# Patient Record
Sex: MALE | Race: BLACK OR AFRICAN AMERICAN | NOT HISPANIC OR LATINO | URBAN - METROPOLITAN AREA
[De-identification: names, ages, dates, MRNs, and addresses within clinical notes are randomized per-mention and may not be internally consistent; named-entity substitution may affect disease eponyms.]

---

## 2017-01-17 ENCOUNTER — EMERGENCY (EMERGENCY)
Facility: HOSPITAL | Age: 32
LOS: 1 days | Discharge: HOME | End: 2017-01-17
Attending: EMERGENCY MEDICINE | Admitting: EMERGENCY MEDICINE
Payer: COMMERCIAL

## 2017-01-17 VITALS
SYSTOLIC BLOOD PRESSURE: 170 MMHG | DIASTOLIC BLOOD PRESSURE: 98 MMHG | RESPIRATION RATE: 18 BRPM | TEMPERATURE: 98 F | HEART RATE: 87 BPM | OXYGEN SATURATION: 99 %
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
## 2017-01-17 DIAGNOSIS — Z91.013 ALLERGY TO SEAFOOD: ICD-10-CM

## 2017-01-17 DIAGNOSIS — S61.212A LACERATION WITHOUT FOREIGN BODY OF RIGHT MIDDLE FINGER WITHOUT DAMAGE TO NAIL, INITIAL ENCOUNTER: ICD-10-CM
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
## 2017-01-17 DIAGNOSIS — Z23 ENCOUNTER FOR IMMUNIZATION: ICD-10-CM
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
## 2017-01-17 PROCEDURE — 90471 IMMUNIZATION ADMIN: CPT

## 2017-01-17 PROCEDURE — 90715 TDAP VACCINE 7 YRS/> IM: CPT

## 2017-01-17 PROCEDURE — 99283 EMERGENCY DEPT VISIT LOW MDM: CPT

## 2017-01-17 PROCEDURE — 99283 EMERGENCY DEPT VISIT LOW MDM: CPT | Mod: 25

## 2017-01-17 RX ORDER — TETANUS TOXOID, REDUCED DIPHTHERIA TOXOID AND ACELLULAR PERTUSSIS VACCINE, ADSORBED 5; 2.5; 8; 8; 2.5 [IU]/.5ML; [IU]/.5ML; UG/.5ML; UG/.5ML; UG/.5ML
0.5 SUSPENSION INTRAMUSCULAR ONCE
Qty: 0 | Refills: 0 | Status: COMPLETED | OUTPATIENT
Start: 2017-01-17 | End: 2017-01-17

## 2017-01-17 RX ORDER — IBUPROFEN 200 MG
600 TABLET ORAL ONCE
Qty: 0 | Refills: 0 | Status: COMPLETED | OUTPATIENT
Start: 2017-01-17 | End: 2017-01-17

## 2017-01-17 RX ADMIN — TETANUS TOXOID, REDUCED DIPHTHERIA TOXOID AND ACELLULAR PERTUSSIS VACCINE, ADSORBED 0.5 MILLILITER(S): 5; 2.5; 8; 8; 2.5 SUSPENSION INTRAMUSCULAR at 11:33

## 2017-01-17 RX ADMIN — Medication 600 MILLIGRAM(S): at 11:34

## 2017-01-17 NOTE — ED PROVIDER NOTE - NS ED ATTENDING STATEMENT MOD
Attending Only I have personally seen and examined this patient. I have fully participated in the care of this patient. I have reviewed all pertinent clinical information, including history physical exam, plan and the Resident's note and agree except as noted

## 2017-01-17 NOTE — ED PROCEDURE NOTE - CPROC ED PHYSICIAN PRESENCE1
I was present during the key portion of the procedure.
I was present during the key portion of the procedure.

## 2017-01-17 NOTE — ED PROVIDER NOTE - PMH
No pertinent past medical history    No pertinent past medical history    Phalanx (hand) fracture  right

## 2017-01-17 NOTE — ED PROVIDER NOTE - MEDICAL DECISION MAKING DETAILS
32yo M w fingertip lac less than 12 hrs ago. Will update tetanus, repair laceration Dr Gifford - 30yo M w fingertip lac less than 12 hrs ago. Will update tetanus, repair laceration

## 2017-01-17 NOTE — ED PROVIDER NOTE - OBJECTIVE STATEMENT
30yo M  w no PMhx in the ER w laceration to R middle finger last night w a knife. No other stx/injuries

## 2017-01-17 NOTE — ED ADULT NURSE NOTE - OBJECTIVE STATEMENT
Patient is a  and cut his right middle finger on a cooking knife last night at about 20:00. Patient cleaned and wrapped the found himself. Presenting this morning to be evaluated to see if the wound needs to be closed.

## 2017-01-27 ENCOUNTER — EMERGENCY (EMERGENCY)
Facility: HOSPITAL | Age: 32
LOS: 1 days | Discharge: HOME | End: 2017-01-27
Attending: EMERGENCY MEDICINE | Admitting: EMERGENCY MEDICINE
Payer: COMMERCIAL

## 2017-01-27 VITALS
OXYGEN SATURATION: 97 % | SYSTOLIC BLOOD PRESSURE: 161 MMHG | RESPIRATION RATE: 16 BRPM | TEMPERATURE: 98 F | HEART RATE: 72 BPM | DIASTOLIC BLOOD PRESSURE: 98 MMHG

## 2017-01-27 DIAGNOSIS — S61.212D LACERATION WITHOUT FOREIGN BODY OF RIGHT MIDDLE FINGER WITHOUT DAMAGE TO NAIL, SUBSEQUENT ENCOUNTER: ICD-10-CM
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
## 2017-01-27 DIAGNOSIS — Z91.013 ALLERGY TO SEAFOOD: ICD-10-CM

## 2017-01-27 PROCEDURE — G0463: CPT

## 2017-01-27 NOTE — ED PROVIDER NOTE - OBJECTIVE STATEMENT
32 y/o M p/w for suture removal; Lac repaired on R. middle finger 10 days ago. No fever, no purulent discharge from wound.

## 2017-01-27 NOTE — ED ADULT NURSE NOTE - OBJECTIVE STATEMENT
comes to ER for suture removal from right 3rd digit. Sutures placed 10 days ago and wound noted to be clean and dry.

## 2017-01-27 NOTE — ED PROVIDER NOTE - PHYSICAL EXAMINATION
Skin: Well healed laceration w/ sutures on distal ventral aspect of R. middle finger. Full sensation of finger.

## 2017-01-27 NOTE — ED PROVIDER NOTE - MEDICAL DECISION MAKING DETAILS
32 y/o M presents for suture removal. 32 y/o M presents for suture removal.  Attending Statement: Agree with the above.  SR, no s/s of infection.  D/C  --LIVIER

## 2018-01-08 NOTE — ED PROVIDER NOTE - ATTESTATION, MLM
I have reviewed and confirmed nurses' notes for patient's medications, allergies, medical history, and surgical history.
Statement Selected

## 2019-12-19 NOTE — ED ADULT NURSE NOTE - TEMPLATE LIST FOR HEAD TO TOE ASSESSMENT
Have Your Skin Lesions Been Treated?: not been treated
Is This A New Presentation, Or A Follow-Up?: Skin Lesions
How Severe Is Your Skin Lesion?: mild
Wounds

## 2020-01-03 ENCOUNTER — EMERGENCY (EMERGENCY)
Facility: HOSPITAL | Age: 35
LOS: 1 days | Discharge: HOME | End: 2020-01-03
Attending: EMERGENCY MEDICINE
Payer: MEDICAID

## 2020-01-03 VITALS
RESPIRATION RATE: 18 BRPM | SYSTOLIC BLOOD PRESSURE: 154 MMHG | OXYGEN SATURATION: 98 % | WEIGHT: 214.95 LBS | TEMPERATURE: 98 F | HEIGHT: 75 IN | HEART RATE: 74 BPM | DIASTOLIC BLOOD PRESSURE: 96 MMHG

## 2020-01-03 DIAGNOSIS — T17.308A UNSPECIFIED FOREIGN BODY IN LARYNX CAUSING OTHER INJURY, INITIAL ENCOUNTER: ICD-10-CM

## 2020-01-03 PROCEDURE — 70490 CT SOFT TISSUE NECK W/O DYE: CPT | Mod: 26

## 2020-01-03 PROCEDURE — 31505 DIAGNOSTIC LARYNGOSCOPY: CPT

## 2020-01-03 PROCEDURE — 99284 EMERGENCY DEPT VISIT MOD MDM: CPT

## 2020-01-03 PROCEDURE — 99284 EMERGENCY DEPT VISIT MOD MDM: CPT | Mod: 25

## 2020-01-03 PROCEDURE — 99285 EMERGENCY DEPT VISIT HI MDM: CPT | Mod: 25

## 2020-01-03 PROCEDURE — 31575 DIAGNOSTIC LARYNGOSCOPY: CPT

## 2020-01-03 PROCEDURE — 70490 CT SOFT TISSUE NECK W/O DYE: CPT

## 2020-01-03 RX ORDER — OMEPRAZOLE 10 MG/1
1 CAPSULE, DELAYED RELEASE ORAL
Qty: 30 | Refills: 0
Start: 2020-01-03 | End: 2020-02-01

## 2020-01-03 NOTE — ED PROVIDER NOTE - CARE PROVIDER_API CALL
Lina Rico)  Otolaryngology  [address] [place] [address], [address]  [place] [address]  Phone: [phone]  Fax: [phone]  Follow Up Time: 7-10 Days

## 2020-01-03 NOTE — ED PROVIDER NOTE - CHPI ED SYMPTOMS NEG
no change in level of consciousness/no chills/no fever/no numbness/no loss of consciousness/no nausea/no blurred vision/no syncope/no weakness/no vomiting

## 2020-01-03 NOTE — ED PROVIDER NOTE - CLINICAL SUMMARY MEDICAL DECISION MAKING FREE TEXT BOX
35 yo M with no pertinent pmhx presenting with FB sensation and pain sp breakfast. Consult ENT for scope

## 2020-01-03 NOTE — ED PROVIDER NOTE - OBJECTIVE STATEMENT
33 yo M with no pertinent pmhx presenting with FB sensation and pain sp breakfast today. patient states he was having pickard and egg on a bagel sandwich when he went to swallow and felt a "crunch" and then began with pain. patient states since then he has not been able to tolerate anything PO. Patient states talking even hurts. Pain is 8/10. He states he has been spitting up his saliva due to the pain. patient denies chest pain, sob, cough, fever, wheezing, nasal congestion, ha, back pain, abd pain, nvd, and dysuria

## 2020-01-03 NOTE — ED ADULT NURSE NOTE - OBJECTIVE STATEMENT
34 yr old male was eating a pickard egg and cheese this am and then he said he felt a sharp pain and now having difficulty swallowing. on assessment a and o x 3 lungs clear abd soft non tender no difficulty breathing some difficulty swallowing but he can swallow. abd soft non tender no swelling in extremities no n/v/d no fevers no other issues.

## 2020-01-03 NOTE — ED PROVIDER NOTE - PROGRESS NOTE DETAILS
scoped by ENT. Normal. Wants CT non con of neck ENT called. Cleared from their stand point. Will follow up with ENT and get PPI. Patient tolerating water PO

## 2020-01-03 NOTE — ED ADULT TRIAGE NOTE - PAIN RATING/NUMBER SCALE (0-10): ACTIVITY
March 9, 2018         Patient: Mary Beth Olivia   YOB: 2013   Date of Visit: 3/9/2018           To Whom it May Concern:    Mary Beth Olivia was seen in my clinic on 3/9/2018. He may return to school on 3/9/2018.    If you have any questions or concerns, please don't hesitate to call.        Sincerely,           Karlie Weir M.D.  Electronically Signed     
8

## 2020-01-03 NOTE — ED PROVIDER NOTE - PATIENT PORTAL LINK FT
You can access the FollowMyHealth Patient Portal offered by [facility] by registering at the following website: [email or web address]. By joining [employer]’s FollowMyHealth portal, you will also be able to view your health information using other applications (apps) compatible with our system.

## 2020-01-03 NOTE — ED PROVIDER NOTE - ENMT, MLM
Airway patent, Nasal mucosa clear. Mouth with normal mucosa. Throat has no vesicles, no oropharyngeal exudates and uvula is midline. No FB noted in oral cavity

## 2020-01-03 NOTE — ED ADULT TRIAGE NOTE - CHIEF COMPLAINT QUOTE
As told by the Fiusama Hickey "Feels Something Got stuck  in the throat  S/P eating Bagel  & fried egg  " Pt is spitting out  Difficulty in swallowing  & C/O throat pain

## 2020-01-03 NOTE — ED PROVIDER NOTE - ATTENDING CONTRIBUTION TO CARE
I have seen and evaluated this patient with the [place] practice clinician.   I agree with the findings  unless other wise stated. I have amended notes where needed.  After my face to face bedside evaluation, I am notin yo M with no pertinent pmhx presenting with FB sensation and pain sp breakfast today. patient states he was having pickard and egg on a bagel sandwich when he went to swallow and felt a "crunch" and then began with pain. patient states since then he has not been able to tolerate anything PO. Patient states talking even hurts. Pain is 8/10. He states he has been spitting up his saliva due to the pain. patient denies chest pain, sob, cough, fever, wheezing, nasal congestion, ha, back pain, abd pain, nvd, and dysuria. No recent dental work  Alert no distress Gen: Alert, NAD Head: NC, AT, PERRL, EOMI, normal lids/conjunctiva ENT: patent oropharynx without erythema/exudate, uvula midline swelling of uvula No swelling of oral cavity floor + cx nodes+Neck: +supple, no tenderness/meningismus/JVD, +Trachea midline Pulm: Bilateral BS, normal resp effort, no wheeze/stridor/retractions CV: RRR, no M/R/G, +dist pulses Abd: soft, NT/ND, +BS,  Mskel: no edema/erythema/cyanosis Skin: no rash Neuro: AAOx3, no sensory/motor deficits, CN 2-12 intact will have ENT eval -- [place]

## 2020-01-03 NOTE — CONSULT NOTE ADULT - SUBJECTIVE AND OBJECTIVE BOX
CC: Foreign body sensation    HPI: 35 yo M with no pertinent pmhx presenting with FB sensation and pain sp breakfast today. patient states he was having pickard and egg on a bagel sandwich when he went to swallow and felt a "crunch" and then began to have pain. patient states since then he has not been able to tolerate anything PO. Patient states talking even hurts. Pain is 8/10. He states he has been spitting up his saliva due to the pain. patient denies chest pain, sob, cough, hemoptysis, fever, nasal congestion, fevers.        PAST MEDICAL & SURGICAL HISTORY:  No pertinent past medical history  No pertinent past medical history  Phalanx (hand) fracture: right  No significant past surgical history  NO SIGNIFICANT PAST SURGICAL HISTORY    Allergies    No Known Drug Allergies  shellfish (Anaphylaxis)  shrimp (Angioedema)    Intolerances      MEDICATIONS  (STANDING):    MEDICATIONS  (PRN):      Social History:     Family history:     ROS:   ENT: all negative except as noted in HPI   CV: denies palpitations  Pulm: denies SOB, cough, hemoptysis  GI: denies change in apetite, indigestion, n/v  : denies pertinent urinary symptoms, urgency  Neuro: denies numbness/tingling, loss of sensation  Psych: denies anxiety  MS: denies muscle weakness, instability  Heme: denies easy bruising or bleeding  Endo: denies heat/cold intolerance, excessive sweating  Vascular: denies LE edema    Vital Signs Last 24 Hrs  T(C): 36.6 (03 Jan 2020 10:50), Max: 36.6 (03 Jan 2020 10:50)  T(F): 97.9 (03 Jan 2020 10:50), Max: 97.9 (03 Jan 2020 10:50)  HR: 74 (03 Jan 2020 10:50) (74 - 74)  BP: 154/96 (03 Jan 2020 10:50) (154/96 - 154/96)  BP(mean): --  RR: 18 (03 Jan 2020 10:50) (18 - 18)  SpO2: 98% (03 Jan 2020 10:50) (98% - 98%)              PHYSICAL EXAM:  Gen: NAD  Skin: No rashes, bruises, or lesions  Head: Normocephalic, Atraumatic  Face: no edema, erythema, or fluctuance. Parotid glands soft without mass  Eyes: no scleral injection  Nose: Nares bilaterally patent, no discharge  Mouth: No Stridor / Drooling / Trismus.  Mucosa moist, tongue/uvula midline, oropharynx clear  Neck: Flat, supple, no lymphadenopathy, trachea midline, no masses  Lymphatic: No lymphadenopathy  Resp: breathing easily, no stridor  CV: no peripheral edema/cyanosis  GI: nondistended   Peripheral vascular: no JVD or edema  Neuro: facial nerve intact, no facial droop            Fiberoptic Indirect laryngoscopy:  (Scope #2 used)      Reason for Laryngoscopy:    Patient was unable to cooperate with mirror.  Nasopharynx, oropharynx, and hypopharynx clear, no bleeding. Tongue base, posterior pharyngeal wall, vallecula, epiglottis, and subglottis appear normal. No erythema, edema, pooling of secretions, masses or lesions. Airway patent, no foreign body visualized. No glottic/supraglottic edema. True vocal cords, arytenoids, vestibular folds, ventricles, pyriform sinuses, and aryepiglottic folds appear normal bilaterally. Vocal cords mobile with good contact b/l.

## 2020-01-03 NOTE — CONSULT NOTE ADULT - ASSESSMENT
33yo male with FB sensation after eating a bagel. Bedside indirect laryngoscopy was unremarkable. No FB was visualized

## 2020-01-03 NOTE — CONSULT NOTE ADULT - ATTENDING COMMENTS
CT scan reviewed with Dr. Gunn, there is a questionable area in the posterior pharynx where there may be a perforation.  On physical exam and on laryngoscopy there is no evidence of trauma to this region so any perforation would have to be pinpoint.  I advised patient that he should have soft diet only and NSAIDs/PPI for pain relief.  He does not have SHANTA and is not on Bipap so he can go home and f/up in 1 week if doing the same or better and then return to the ED if doing worse.  He understands.  He did not eat any bones and has no evidence of any sharp foreign body on his scan.

## 2020-01-03 NOTE — ED PROVIDER NOTE - NSFOLLOWUPINSTRUCTIONS_ED_ALL_ED_FT
rest and hydration  Take medication as discussed  soft food diet  follow up with ENT in one week [phone]  Return to the ER immediately for worsening symptoms

## 2020-01-07 ENCOUNTER — RECORD ABSTRACTING (OUTPATIENT)
Age: 35
End: 2020-01-07

## 2020-01-09 NOTE — ED PROVIDER NOTE - SKIN, MLM
Skin normal color for race, warm, dry and intact. No evidence of rash. Additional Notes: Patient begin to feel light headed after biopsy, She was advised to lay with her legs elevated  for five minutes before leaving.\\nPatient felt better and was able to check out with no problems Detail Level: Detailed

## 2020-06-24 ENCOUNTER — OUTPATIENT (OUTPATIENT)
Dept: OUTPATIENT SERVICES | Facility: HOSPITAL | Age: 35
LOS: 1 days | End: 2020-06-24

## 2020-06-25 LAB — SARS-COV-2 RNA SPEC QL NAA+PROBE: SIGNIFICANT CHANGE UP

## 2020-08-04 NOTE — ED PROCEDURE NOTE - CPROC ED NEEDLE GAUGE1
Patient would like communication of their results via:        Cell Phone:   Telephone Information:   Mobile [phone]     Okay to leave a message containing results? Yes   25

## 2022-04-09 NOTE — ED ADULT TRIAGE NOTE - ESI TRIAGE ACUITY LEVEL, MLM
2 Additional Notes: Patient consent was obtained to proceed with the visit and recommended plan of care after discussion of all risks and benefits, including the risks of COVID-19 exposure. Detail Level: Simple [Follow-Up] : a follow-up visit [TextBox_44] : Physical Exam

## 2023-09-14 ENCOUNTER — EMERGENCY (EMERGENCY)
Facility: HOSPITAL | Age: 38
LOS: 0 days | Discharge: HOME | End: 2023-09-14
Attending: STUDENT IN AN ORGANIZED HEALTH CARE EDUCATION/TRAINING PROGRAM
Payer: COMMERCIAL

## 2023-09-14 VITALS
WEIGHT: 169.98 LBS | HEIGHT: 73 IN | SYSTOLIC BLOOD PRESSURE: 159 MMHG | DIASTOLIC BLOOD PRESSURE: 97 MMHG | HEART RATE: 79 BPM | OXYGEN SATURATION: 99 % | RESPIRATION RATE: 17 BRPM | TEMPERATURE: 98 F

## 2023-09-14 VITALS
HEART RATE: 70 BPM | TEMPERATURE: 98 F | RESPIRATION RATE: 19 BRPM | DIASTOLIC BLOOD PRESSURE: 91 MMHG | OXYGEN SATURATION: 97 % | SYSTOLIC BLOOD PRESSURE: 140 MMHG

## 2023-09-14 DIAGNOSIS — Z91.013 ALLERGY TO SEAFOOD: ICD-10-CM
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
## 2023-09-14 DIAGNOSIS — S61.214A LACERATION WITHOUT FOREIGN BODY OF RIGHT RING FINGER WITHOUT DAMAGE TO NAIL, INITIAL ENCOUNTER: ICD-10-CM

## 2023-09-14 DIAGNOSIS — S61.210A LACERATION WITHOUT FOREIGN BODY OF RIGHT INDEX FINGER WITHOUT DAMAGE TO NAIL, INITIAL ENCOUNTER: ICD-10-CM

## 2023-09-14 DIAGNOSIS — Z23 ENCOUNTER FOR IMMUNIZATION: ICD-10-CM

## 2023-09-14 DIAGNOSIS — S61.212A LACERATION WITHOUT FOREIGN BODY OF RIGHT MIDDLE FINGER WITHOUT DAMAGE TO NAIL, INITIAL ENCOUNTER: ICD-10-CM
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
## 2023-09-14 PROCEDURE — 99284 EMERGENCY DEPT VISIT MOD MDM: CPT | Mod: 25

## 2023-09-14 PROCEDURE — 73130 X-RAY EXAM OF HAND: CPT | Mod: 26,RT

## 2023-09-14 PROCEDURE — 12001 RPR S/N/AX/GEN/TRNK 2.5CM/<: CPT

## 2023-09-14 PROCEDURE — 73110 X-RAY EXAM OF WRIST: CPT | Mod: 26,RT

## 2023-09-14 RX ORDER — TETANUS TOXOID, REDUCED DIPHTHERIA TOXOID AND ACELLULAR PERTUSSIS VACCINE, ADSORBED 5; 2.5; 8; 8; 2.5 [IU]/.5ML; [IU]/.5ML; UG/.5ML; UG/.5ML; UG/.5ML
0.5 SUSPENSION INTRAMUSCULAR ONCE
Refills: 0 | Status: COMPLETED | OUTPATIENT
Start: 2023-09-14 | End: 2023-09-14

## 2023-09-14 RX ORDER — OXYCODONE HYDROCHLORIDE 5 MG/1
5 TABLET ORAL ONCE
Refills: 0 | Status: DISCONTINUED | OUTPATIENT
Start: 2023-09-14 | End: 2023-09-14

## 2023-09-14 RX ORDER — TETANUS TOXOID, REDUCED DIPHTHERIA TOXOID AND ACELLULAR PERTUSSIS VACCINE, ADSORBED 5; 2.5; 8; 8; 2.5 [IU]/.5ML; [IU]/.5ML; UG/.5ML; UG/.5ML; UG/.5ML
0.5 SUSPENSION INTRAMUSCULAR ONCE
Refills: 0 | Status: DISCONTINUED | OUTPATIENT
Start: 2023-09-14 | End: 2023-09-14

## 2023-09-14 RX ORDER — IBUPROFEN 200 MG
1 TABLET ORAL
Qty: 12 | Refills: 0
Start: 2023-09-14 | End: 2023-09-16

## 2023-09-14 RX ADMIN — TETANUS TOXOID, REDUCED DIPHTHERIA TOXOID AND ACELLULAR PERTUSSIS VACCINE, ADSORBED 0.5 MILLILITER(S): 5; 2.5; 8; 8; 2.5 SUSPENSION INTRAMUSCULAR at 21:13

## 2023-09-14 RX ADMIN — OXYCODONE HYDROCHLORIDE 5 MILLIGRAM(S): 5 TABLET ORAL at 21:12

## 2023-09-14 NOTE — ED PROVIDER NOTE - CLINICAL SUMMARY MEDICAL DECISION MAKING FREE TEXT BOX
37-year-old male, lacerations.  Vital signs unremarkable.  No hard signs of neurovascular or deep tendinous injury.  Rule out foreign body, fracture.  Acute wound care.  Tetanus updated.

## 2023-09-14 NOTE — ED PROVIDER NOTE - NS ED ATTENDING STATEMENT MOD
I have seen and examined this patient and fully participated in the care of this patient as the teaching attending.  The service was shared with the KATALINA.  I reviewed and verified the documentation and independently performed the documented:

## 2023-09-14 NOTE — ED ADULT NURSE NOTE - CHPI ED NUR QUALITY2
Continue Regimen: Triamcinolone for flares
Detail Level: Generalized
Otc Regimen: Capsaicin cream
obvious physical injury

## 2023-09-14 NOTE — ED PROCEDURE NOTE - PROCEDURE ADDITIONAL DETAILS
For lacerations, on the index day or 2, the middle finger there is 1, the fourth finger there is 1.  The 2 lacerations on the index finger required sick suture total, 4 oh.  Three 4-0 suture in the middle finger.  Three 4-0 sutures in the fourth digit.  All were nylon.  All were simple interrupted.  Follow-up in 7 to 10 days for removal.  Copious irrigation was applied prior to repair.  Analgesia was achieved with lidocaine with epinephrine and digital nerve block fashion.  No foreign bodies or underlying tendinous injuries were appreciated.  Patient tolerated procedure well is neurovascular intact status post procedure.  Given move his hand without difficulty.  Dressing was applied.

## 2023-09-14 NOTE — ED PROVIDER NOTE - PATIENT PORTAL LINK FT
You can access the FollowMyHealth Patient Portal offered by [facility] by registering at the following website: [email or web address]. By joining [employer]’s FollowMyHealth portal, you will also be able to view your health information using other applications (apps) compatible with our system.
[id number]

## 2023-09-14 NOTE — ED PROVIDER NOTE - NSICDXPASTSURGICALHX_GEN_ALL_CORE_FT
PAST SURGICAL HISTORY:  NO SIGNIFICANT PAST SURGICAL HISTORY     No significant past surgical history

## 2023-09-14 NOTE — ED PROVIDER NOTE - ATTENDING CONTRIBUTION TO CARE
36 y/o M presenting to the ED c/o laceration to the R hand.  Patient reports punching a television out of anger.  Reports some pain in swelling to the hand.  No numbness/tingling/weakness.  Unsure of tetanus status.  In the ED, vitals stable.  Patient is well appearing in NAD.  Exam with multiple lacerations.   Will obtain XR to r/o occult fx/FB  Update tetanus  Lac repair

## 2023-09-14 NOTE — ED PROVIDER NOTE - PROGRESS NOTE DETAILS
Krishan Elizalde MD (PGY-3)   Lack repair.  See note.  X-ray unremarkable.  No fracture noted. PCP follow up.

## 2023-09-14 NOTE — ED PROVIDER NOTE - NS ED ROS FT
GENERAL: no fever  EYES: no eye pain  HEENT: no neck pain  CARDIAC: no chest pain  PULMONARY: no SOB  GI: no abdominal pain  : no dysuria  SKIN: + laceration  NEURO: no headache  MSK: no new joint pain

## 2023-09-14 NOTE — ED PROVIDER NOTE - OBJECTIVE STATEMENT
This is a 36 yo M w/ a PMHX = none who presents w/ a laceration.  Setting: punched television in anger. no SI/HI.   Onset: 1 hr pta  No known contamination w/ soil or fecal matter. No hx of immunodeficiency, PVD, or DM. No indwelling biomedical devices. No known allergies to injectable anesthetics.  Home Rx = none .  Last tetanus shot = unknown.

## 2023-09-14 NOTE — ED ADULT NURSE NOTE - OBJECTIVE STATEMENT
Pt is AOx4 c/o R hand injury/lacerations. Multiple shallow lacerations noted to R hand, bleeding controlled. Pt thinks he may have broken his hand, has hx of R hand fracture and had rods placed, hand is edematous. Says this feels the same. C/o 10/10 pain. Pulses and sensation present

## 2023-09-14 NOTE — ED ADULT TRIAGE NOTE - CHIEF COMPLAINT QUOTE
Pt c/o laceration on fingers and hand. Bleeding controlled, swelling and noted. Pt said he might have a broke bone. Pt punched the wall. Pt not sure when was his tdap. Pt denies any pmhx. NKDA Pt c/o laceration (small cut) on fingers and hand. Bleeding controlled, swelling and noted. Pt said he might have a broken bone. Pt punched the wall. Pt not sure when was his tdap. Pt denies any pmhx. NKDA Pt c/o laceration (small cut) on fingers and hand. Bleeding controlled, swellin noted. Pt said he might have a broken bone. Pt punched the wall. Pt not sure when was his tdap. Pt denies any pmhx. NKDA

## 2023-09-14 NOTE — ED PROVIDER NOTE - NSFOLLOWUPINSTRUCTIONS_ED_ALL_ED_FT
Follow up with the Primary Care Clinic. You will be called in 24-48 hours to make the appointment. Call the Emergency Department if you have difficulties getting your appointment. Our phone number can be found on the upper left hand side of this paperwork.     Immediately return to the Emergency Department for any new or markedly worsening symptoms.    Follow up in 7-10 days for suture removal. You can return to the ED or go to an urgent care.     Alternate between Tylenol and Ibuprofen. Take 1 g of Tylenol, 4 hours later take 600 mg of Ibuprofen, 4 hours after this take 1 g of Tylenol. Continuing alternating like this as needed for pain. If you do not have pain, you do not need to take this medication.

## 2023-09-14 NOTE — ED ADULT NURSE NOTE - CHIEF COMPLAINT QUOTE
Pt c/o laceration (small cut) on fingers and hand. Bleeding controlled, swellin noted. Pt said he might have a broken bone. Pt punched the wall. Pt not sure when was his tdap. Pt denies any pmhx. NKDA

## 2023-09-14 NOTE — ED PROVIDER NOTE - NSICDXPASTMEDICALHX_GEN_ALL_CORE_FT
PAST MEDICAL HISTORY:  No pertinent past medical history     No pertinent past medical history     Phalanx (hand) fracture right

## 2023-09-14 NOTE — ED PROVIDER NOTE - PHYSICAL EXAMINATION
Gen: NAD, non-toxic appearing  Head: normal appearing  HEENT: normal conjunctiva  Lung: no respiratory distress, speaking in full sentences, ctab     CV: regular rate and rhythm, no murmurs  Abd: soft, non distended, non tender   MSK: no visible deformities  Neuro: alert and grossly oriented, no gross motor deficits  Skin: 4 lacerations to the fingers, see lac note. abrasions to x3 areas over the dorsum of the hand.

## 2023-09-16 NOTE — ED POST DISCHARGE NOTE - DETAILS
Called patient 2nd attempt unreachable, left message to call back the ED. 3rd attempt to call, unreachable, left message  on voicemail to call back the ED. Mailgram sent.

## 2023-09-16 NOTE — ED POST DISCHARGE NOTE - ADDITIONAL DOCUMENTATION
pt called back after receiving voicemail, made aware of the result and will come to the ED for splint. Pt understands and will come back to the ED.

## 2023-09-20 ENCOUNTER — INPATIENT (INPATIENT)
Facility: HOSPITAL | Age: 38
LOS: 7 days | Discharge: HOME | End: 2023-09-28
Attending: STUDENT IN AN ORGANIZED HEALTH CARE EDUCATION/TRAINING PROGRAM | Admitting: STUDENT IN AN ORGANIZED HEALTH CARE EDUCATION/TRAINING PROGRAM
Payer: COMMERCIAL

## 2023-09-20 VITALS
HEART RATE: 81 BPM | TEMPERATURE: 99 F | RESPIRATION RATE: 18 BRPM | SYSTOLIC BLOOD PRESSURE: 148 MMHG | HEIGHT: 73 IN | WEIGHT: 175.05 LBS | DIASTOLIC BLOOD PRESSURE: 100 MMHG | OXYGEN SATURATION: 99 %

## 2023-09-20 DIAGNOSIS — S62.300B: ICD-10-CM
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
## 2023-09-20 DIAGNOSIS — S62.302A UNSPECIFIED FRACTURE OF THIRD METACARPAL BONE, RIGHT HAND, INITIAL ENCOUNTER FOR CLOSED FRACTURE: ICD-10-CM

## 2023-09-20 DIAGNOSIS — S53.441A ULNAR COLLATERAL LIGAMENT SPRAIN OF RIGHT ELBOW, INITIAL ENCOUNTER: ICD-10-CM

## 2023-09-20 DIAGNOSIS — S66.821A LACERATION OF OTHER SPECIFIED MUSCLES, FASCIA AND TENDONS AT WRIST AND HAND LEVEL, RIGHT HAND, INITIAL ENCOUNTER: ICD-10-CM

## 2023-09-20 DIAGNOSIS — L02.511 CUTANEOUS ABSCESS OF RIGHT HAND: ICD-10-CM

## 2023-09-20 DIAGNOSIS — S66.322A LACERATION OF EXTENSOR MUSCLE, FASCIA AND TENDON OF RIGHT MIDDLE FINGER AT WRIST AND HAND LEVEL, INITIAL ENCOUNTER: ICD-10-CM

## 2023-09-20 DIAGNOSIS — S66.320A LACERATION OF EXTENSOR MUSCLE, FASCIA AND TENDON OF RIGHT INDEX FINGER AT WRIST AND HAND LEVEL, INITIAL ENCOUNTER: ICD-10-CM

## 2023-09-20 DIAGNOSIS — T40.2X5A ADVERSE EFFECT OF OTHER OPIOIDS, INITIAL ENCOUNTER: ICD-10-CM

## 2023-09-20 DIAGNOSIS — S53.21XA TRAUMATIC RUPTURE OF RIGHT RADIAL COLLATERAL LIGAMENT, INITIAL ENCOUNTER: ICD-10-CM

## 2023-09-20 DIAGNOSIS — B95.61 METHICILLIN SUSCEPTIBLE STAPHYLOCOCCUS AUREUS INFECTION AS THE CAUSE OF DISEASES CLASSIFIED ELSEWHERE: ICD-10-CM

## 2023-09-20 DIAGNOSIS — L08.9 LOCAL INFECTION OF THE SKIN AND SUBCUTANEOUS TISSUE, UNSPECIFIED: ICD-10-CM

## 2023-09-20 DIAGNOSIS — K59.03 DRUG INDUCED CONSTIPATION: ICD-10-CM
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
## 2023-09-20 DIAGNOSIS — L03.113 CELLULITIS OF RIGHT UPPER LIMB: ICD-10-CM

## 2023-09-20 DIAGNOSIS — Z91.013 ALLERGY TO SEAFOOD: ICD-10-CM

## 2023-09-20 DIAGNOSIS — S62.181A: ICD-10-CM

## 2023-09-20 LAB
ALBUMIN SERPL ELPH-MCNC: 3.7 G/DL — SIGNIFICANT CHANGE UP (ref 3.3–5)
ALP SERPL-CCNC: 80 U/L — SIGNIFICANT CHANGE UP (ref 40–120)
ALT FLD-CCNC: 17 U/L — SIGNIFICANT CHANGE UP (ref 12–78)
ANION GAP SERPL CALC-SCNC: 4 MMOL/L — LOW (ref 5–17)
APPEARANCE UR: CLEAR — SIGNIFICANT CHANGE UP
APTT BLD: 34.7 SEC — SIGNIFICANT CHANGE UP (ref 24.5–35.6)
AST SERPL-CCNC: 18 U/L — SIGNIFICANT CHANGE UP (ref 15–37)
BACTERIA # UR AUTO: ABNORMAL
BASOPHILS # BLD AUTO: 0.03 K/UL — SIGNIFICANT CHANGE UP (ref 0–0.2)
BASOPHILS NFR BLD AUTO: 0.4 % — SIGNIFICANT CHANGE UP (ref 0–2)
BILIRUB SERPL-MCNC: 0.5 MG/DL — SIGNIFICANT CHANGE UP (ref 0.2–1.2)
BILIRUB UR-MCNC: NEGATIVE — SIGNIFICANT CHANGE UP
BUN SERPL-MCNC: 10 MG/DL — SIGNIFICANT CHANGE UP (ref 7–23)
CALCIUM SERPL-MCNC: 9.1 MG/DL — SIGNIFICANT CHANGE UP (ref 8.5–10.1)
CHLORIDE SERPL-SCNC: 105 MMOL/L — SIGNIFICANT CHANGE UP (ref 96–108)
CO2 SERPL-SCNC: 32 MMOL/L — HIGH (ref 22–31)
COLOR SPEC: YELLOW — SIGNIFICANT CHANGE UP
CREAT SERPL-MCNC: 0.95 MG/DL — SIGNIFICANT CHANGE UP (ref 0.5–1.3)
DIFF PNL FLD: NEGATIVE — SIGNIFICANT CHANGE UP
EGFR: 107 ML/MIN/1.73M2 — SIGNIFICANT CHANGE UP
EOSINOPHIL # BLD AUTO: 0.13 K/UL — SIGNIFICANT CHANGE UP (ref 0–0.5)
EOSINOPHIL NFR BLD AUTO: 1.8 % — SIGNIFICANT CHANGE UP (ref 0–6)
EPI CELLS # UR: SIGNIFICANT CHANGE UP
ERYTHROCYTE [SEDIMENTATION RATE] IN BLOOD: 13 MM/HR — SIGNIFICANT CHANGE UP (ref 0–15)
GLUCOSE SERPL-MCNC: 91 MG/DL — SIGNIFICANT CHANGE UP (ref 70–99)
GLUCOSE UR QL: NEGATIVE MG/DL — SIGNIFICANT CHANGE UP
GRAM STN FLD: SIGNIFICANT CHANGE UP
HCT VFR BLD CALC: 40.7 % — SIGNIFICANT CHANGE UP (ref 39–50)
HGB BLD-MCNC: 13.7 G/DL — SIGNIFICANT CHANGE UP (ref 13–17)
IMM GRANULOCYTES NFR BLD AUTO: 0.3 % — SIGNIFICANT CHANGE UP (ref 0–0.9)
INR BLD: 0.88 RATIO — SIGNIFICANT CHANGE UP (ref 0.85–1.18)
KETONES UR-MCNC: ABNORMAL
LACTATE SERPL-SCNC: 1 MMOL/L — SIGNIFICANT CHANGE UP (ref 0.7–2)
LEUKOCYTE ESTERASE UR-ACNC: NEGATIVE — SIGNIFICANT CHANGE UP
LYMPHOCYTES # BLD AUTO: 1.54 K/UL — SIGNIFICANT CHANGE UP (ref 1–3.3)
LYMPHOCYTES # BLD AUTO: 21.7 % — SIGNIFICANT CHANGE UP (ref 13–44)
MCHC RBC-ENTMCNC: 30.3 PG — SIGNIFICANT CHANGE UP (ref 27–34)
MCHC RBC-ENTMCNC: 33.7 G/DL — SIGNIFICANT CHANGE UP (ref 32–36)
MCV RBC AUTO: 90 FL — SIGNIFICANT CHANGE UP (ref 80–100)
MONOCYTES # BLD AUTO: 0.66 K/UL — SIGNIFICANT CHANGE UP (ref 0–0.9)
MONOCYTES NFR BLD AUTO: 9.3 % — SIGNIFICANT CHANGE UP (ref 2–14)
NEUTROPHILS # BLD AUTO: 4.72 K/UL — SIGNIFICANT CHANGE UP (ref 1.8–7.4)
NEUTROPHILS NFR BLD AUTO: 66.5 % — SIGNIFICANT CHANGE UP (ref 43–77)
NITRITE UR-MCNC: NEGATIVE — SIGNIFICANT CHANGE UP
NRBC # BLD: 0 /100 WBCS — SIGNIFICANT CHANGE UP (ref 0–0)
PH UR: 6.5 — SIGNIFICANT CHANGE UP (ref 5–8)
PLATELET # BLD AUTO: 143 K/UL — LOW (ref 150–400)
POTASSIUM SERPL-MCNC: 4.2 MMOL/L — SIGNIFICANT CHANGE UP (ref 3.5–5.3)
POTASSIUM SERPL-SCNC: 4.2 MMOL/L — SIGNIFICANT CHANGE UP (ref 3.5–5.3)
PROT SERPL-MCNC: 7.4 GM/DL — SIGNIFICANT CHANGE UP (ref 6–8.3)
PROT UR-MCNC: 15 MG/DL
PROTHROM AB SERPL-ACNC: 10.5 SEC — SIGNIFICANT CHANGE UP (ref 9.5–13)
RBC # BLD: 4.52 M/UL — SIGNIFICANT CHANGE UP (ref 4.2–5.8)
RBC # FLD: 14 % — SIGNIFICANT CHANGE UP (ref 10.3–14.5)
RBC CASTS # UR COMP ASSIST: NEGATIVE /HPF — SIGNIFICANT CHANGE UP (ref 0–4)
SODIUM SERPL-SCNC: 141 MMOL/L — SIGNIFICANT CHANGE UP (ref 135–145)
SP GR SPEC: 1.01 — SIGNIFICANT CHANGE UP (ref 1.01–1.02)
SPECIMEN SOURCE: SIGNIFICANT CHANGE UP
UROBILINOGEN FLD QL: NEGATIVE MG/DL — SIGNIFICANT CHANGE UP
WBC # BLD: 7.1 K/UL — SIGNIFICANT CHANGE UP (ref 3.8–10.5)
WBC # FLD AUTO: 7.1 K/UL — SIGNIFICANT CHANGE UP (ref 3.8–10.5)
WBC UR QL: SIGNIFICANT CHANGE UP

## 2023-09-20 PROCEDURE — 73200 CT UPPER EXTREMITY W/O DYE: CPT | Mod: 26,RT

## 2023-09-20 PROCEDURE — 93010 ELECTROCARDIOGRAM REPORT: CPT

## 2023-09-20 PROCEDURE — 99222 1ST HOSP IP/OBS MODERATE 55: CPT

## 2023-09-20 PROCEDURE — 71045 X-RAY EXAM CHEST 1 VIEW: CPT | Mod: 26

## 2023-09-20 PROCEDURE — 99285 EMERGENCY DEPT VISIT HI MDM: CPT

## 2023-09-20 RX ORDER — LANOLIN ALCOHOL/MO/W.PET/CERES
3 CREAM (GRAM) TOPICAL AT BEDTIME
Refills: 0 | Status: DISCONTINUED | OUTPATIENT
Start: 2023-09-20 | End: 2023-09-28

## 2023-09-20 RX ORDER — KETOROLAC TROMETHAMINE 30 MG/ML
15 SYRINGE (ML) INJECTION ONCE
Refills: 0 | Status: DISCONTINUED | OUTPATIENT
Start: 2023-09-20 | End: 2023-09-20

## 2023-09-20 RX ORDER — OXYCODONE HYDROCHLORIDE 5 MG/1
5 TABLET ORAL EVERY 6 HOURS
Refills: 0 | Status: DISCONTINUED | OUTPATIENT
Start: 2023-09-20 | End: 2023-09-27

## 2023-09-20 RX ORDER — SENNA PLUS 8.6 MG/1
2 TABLET ORAL AT BEDTIME
Refills: 0 | Status: DISCONTINUED | OUTPATIENT
Start: 2023-09-20 | End: 2023-09-28

## 2023-09-20 RX ORDER — HYDROMORPHONE HYDROCHLORIDE 2 MG/ML
0.5 INJECTION INTRAMUSCULAR; INTRAVENOUS; SUBCUTANEOUS EVERY 6 HOURS
Refills: 0 | Status: DISCONTINUED | OUTPATIENT
Start: 2023-09-20 | End: 2023-09-22

## 2023-09-20 RX ORDER — MORPHINE SULFATE 50 MG/1
4 CAPSULE, EXTENDED RELEASE ORAL ONCE
Refills: 0 | Status: DISCONTINUED | OUTPATIENT
Start: 2023-09-20 | End: 2023-09-20

## 2023-09-20 RX ORDER — SODIUM CHLORIDE 9 MG/ML
2500 INJECTION, SOLUTION INTRAVENOUS ONCE
Refills: 0 | Status: COMPLETED | OUTPATIENT
Start: 2023-09-20 | End: 2023-09-20

## 2023-09-20 RX ORDER — VANCOMYCIN HCL 1 G
1000 VIAL (EA) INTRAVENOUS ONCE
Refills: 0 | Status: COMPLETED | OUTPATIENT
Start: 2023-09-20 | End: 2023-09-20

## 2023-09-20 RX ORDER — AMPICILLIN SODIUM AND SULBACTAM SODIUM 250; 125 MG/ML; MG/ML
3 INJECTION, POWDER, FOR SUSPENSION INTRAMUSCULAR; INTRAVENOUS ONCE
Refills: 0 | Status: COMPLETED | OUTPATIENT
Start: 2023-09-20 | End: 2023-09-20

## 2023-09-20 RX ORDER — MORPHINE SULFATE 50 MG/1
2 CAPSULE, EXTENDED RELEASE ORAL EVERY 4 HOURS
Refills: 0 | Status: DISCONTINUED | OUTPATIENT
Start: 2023-09-20 | End: 2023-09-27

## 2023-09-20 RX ORDER — AMPICILLIN SODIUM AND SULBACTAM SODIUM 250; 125 MG/ML; MG/ML
3 INJECTION, POWDER, FOR SUSPENSION INTRAMUSCULAR; INTRAVENOUS EVERY 6 HOURS
Refills: 0 | Status: DISCONTINUED | OUTPATIENT
Start: 2023-09-21 | End: 2023-09-27

## 2023-09-20 RX ORDER — VANCOMYCIN HCL 1 G
1250 VIAL (EA) INTRAVENOUS EVERY 8 HOURS
Refills: 0 | Status: DISCONTINUED | OUTPATIENT
Start: 2023-09-20 | End: 2023-09-23

## 2023-09-20 RX ORDER — VANCOMYCIN HCL 1 G
1250 VIAL (EA) INTRAVENOUS EVERY 12 HOURS
Refills: 0 | Status: DISCONTINUED | OUTPATIENT
Start: 2023-09-20 | End: 2023-09-20

## 2023-09-20 RX ORDER — ONDANSETRON 8 MG/1
4 TABLET, FILM COATED ORAL ONCE
Refills: 0 | Status: COMPLETED | OUTPATIENT
Start: 2023-09-20 | End: 2023-09-20

## 2023-09-20 RX ORDER — HYDROMORPHONE HYDROCHLORIDE 2 MG/ML
0.5 INJECTION INTRAMUSCULAR; INTRAVENOUS; SUBCUTANEOUS ONCE
Refills: 0 | Status: DISCONTINUED | OUTPATIENT
Start: 2023-09-20 | End: 2023-09-20

## 2023-09-20 RX ORDER — ACETAMINOPHEN 500 MG
650 TABLET ORAL EVERY 6 HOURS
Refills: 0 | Status: DISCONTINUED | OUTPATIENT
Start: 2023-09-20 | End: 2023-09-28

## 2023-09-20 RX ADMIN — ONDANSETRON 4 MILLIGRAM(S): 8 TABLET, FILM COATED ORAL at 15:07

## 2023-09-20 RX ADMIN — OXYCODONE HYDROCHLORIDE 5 MILLIGRAM(S): 5 TABLET ORAL at 16:16

## 2023-09-20 RX ADMIN — MORPHINE SULFATE 2 MILLIGRAM(S): 50 CAPSULE, EXTENDED RELEASE ORAL at 16:25

## 2023-09-20 RX ADMIN — SODIUM CHLORIDE 2500 MILLILITER(S): 9 INJECTION, SOLUTION INTRAVENOUS at 15:08

## 2023-09-20 RX ADMIN — Medication 1000 MILLIGRAM(S): at 16:20

## 2023-09-20 RX ADMIN — HYDROMORPHONE HYDROCHLORIDE 0.5 MILLIGRAM(S): 2 INJECTION INTRAMUSCULAR; INTRAVENOUS; SUBCUTANEOUS at 17:16

## 2023-09-20 RX ADMIN — OXYCODONE HYDROCHLORIDE 5 MILLIGRAM(S): 5 TABLET ORAL at 23:30

## 2023-09-20 RX ADMIN — SENNA PLUS 2 TABLET(S): 8.6 TABLET ORAL at 22:21

## 2023-09-20 RX ADMIN — AMPICILLIN SODIUM AND SULBACTAM SODIUM 200 GRAM(S): 250; 125 INJECTION, POWDER, FOR SUSPENSION INTRAMUSCULAR; INTRAVENOUS at 16:17

## 2023-09-20 RX ADMIN — Medication 250 MILLIGRAM(S): at 15:08

## 2023-09-20 RX ADMIN — AMPICILLIN SODIUM AND SULBACTAM SODIUM 200 GRAM(S): 250; 125 INJECTION, POWDER, FOR SUSPENSION INTRAMUSCULAR; INTRAVENOUS at 23:41

## 2023-09-20 RX ADMIN — OXYCODONE HYDROCHLORIDE 5 MILLIGRAM(S): 5 TABLET ORAL at 22:30

## 2023-09-20 RX ADMIN — MORPHINE SULFATE 4 MILLIGRAM(S): 50 CAPSULE, EXTENDED RELEASE ORAL at 15:18

## 2023-09-20 RX ADMIN — Medication 15 MILLIGRAM(S): at 17:17

## 2023-09-20 RX ADMIN — MORPHINE SULFATE 4 MILLIGRAM(S): 50 CAPSULE, EXTENDED RELEASE ORAL at 15:08

## 2023-09-20 RX ADMIN — Medication 166.67 MILLIGRAM(S): at 22:21

## 2023-09-20 NOTE — PATIENT PROFILE ADULT - FALL HARM RISK - UNIVERSAL INTERVENTIONS
Bed in lowest position, wheels locked, appropriate side rails in place/Call bell, personal items and telephone in reach/Instruct patient to call for assistance before getting out of bed or chair/Non-slip footwear when patient is out of bed/[place] to call system/Physically safe environment - no spills, clutter or unnecessary equipment/Purposeful Proactive Rounding/Room/bathroom lighting operational, light cord in reach

## 2023-09-20 NOTE — SBIRT NOTE ADULT - NSSBIRTDRGUSEDOTHTHAN_GEN_A_CORE
Pt reports that he has medical cannabis card and is utilizing marijuana for sleep, appetite, and restlessness.

## 2023-09-20 NOTE — ED PROVIDER NOTE - MDM ORDERS SUBMITTED SELECTION
Labs/Medications Bilobed Transposition Flap Text: The defect edges were debeveled with a #15 scalpel blade.  Given the location of the defect and the proximity to free margins a bilobed transposition flap was deemed most appropriate.  Using a sterile surgical marker, an appropriate bilobe flap drawn around the defect.    The area thus outlined was incised deep to adipose tissue with a #15 scalpel blade.  The skin margins were undermined to an appropriate distance in all directions utilizing iris scissors.

## 2023-09-20 NOTE — ED ADULT TRIAGE NOTE - CHIEF COMPLAINT QUOTE
Right hand injury, noted swelling in the rt hand for few days now, stated he punched something last friday, was seen here and was sutured on the lac area but hand continued to swelled up.

## 2023-09-20 NOTE — H&P ADULT - HISTORY OF PRESENT ILLNESS
37 years old male with no significant medical history present to ED with right hand pain and swelling. Patient had lacerations of fingers after pumching television on 9/14/2023, s/p laceration repair. Patient reported gradual worsening of severe pain of right fingers. Denied any fever or chills. Received Tdap on 9/14/23  Hemodynamically stable, afebrile, sat well at RA. No leukocytosis, plt 143, lactate 1, Cr 0.95. Xray on 9/14/23 with finding of possible 3rd and 4th metacarpals fracture. Seen by plastic surgery, s/p I&D at bedside. Noted abscess. Will need further OR for multiple fracture, tendons injury repair.    SH: cannabis use  FH: kidney disease

## 2023-09-20 NOTE — ED ADULT TRIAGE NOTE - PRO INTERPRETER NEED 2
You can access the FollowMyHealth Patient Portal offered by [facility] by registering at the following website: [email or web address]. By joining [employer]’s FollowMyHealth portal, you will also be able to view your health information using other applications (apps) compatible with our system.
English

## 2023-09-20 NOTE — ED ADULT NURSE NOTE - OBJECTIVE STATEMENT
37yM A&Ox4 presenting to ED with right hand pain, swelling and pus drainage. pt reports last Friday he got upset and punched an LED TV with his right hand. pt reports he came to ED following the incident and was informed that his hand was not broken, and that he only needed stitches for the lacerations to the 2nd, 3rd and 5th digits on the right hand. subsequently, pt was called by JESSICA Linares and informed that the Imaging did in fact indicate a fracture of the right hand and that he was to return asap to the ED. upon arrival, pt right hand noted to be very swollen, red and hot to the touch. additionally yellow pus observed to be draining from sutures.  NKDA. no daily meds.

## 2023-09-20 NOTE — PATIENT PROFILE ADULT - STATED REASON FOR ADMISSION
Patient was told to come back in for further evaluation of right hand, found out he had a fracture.

## 2023-09-20 NOTE — ED PROVIDER NOTE - PROGRESS NOTE DETAILS
discussed with Dr. Anderson hand surgeon out the official report of right hand and right wrist done on 9/14/23 and physical examine of pt's right hand/fingers and Dr. Anderson sts give pt a dose of unasyn 3.0 gm ivpb and place pt on the splint and discharge pt with augmentin 875 mg bid and he will see pt at his office tomorrow.  He sts pt does not need to be admitted. dr Sasson called here and sts he is coming to see pt now. discussed with Dr. Anderson hand surgeon out the official report of right hand and right wrist done on 9/14/23 and physical examine of pt's right hand/fingers and Dr. Anderson sts give pt a dose of unasyn 3.0 gm ivpb Dr. Anderson hand surgeon was here eval and performed I & D on the right hand wound culture was sent Dr. Anthony is notified and admit pt.

## 2023-09-20 NOTE — ED PROVIDER NOTE - PHYSICAL EXAMINATION
Skin- + swelling hot to touch of right hand erythema from the dorsal aspect of the right hand spread to the mid right fore ar. + sutures are intact to the right 2nd/4th/5th PIP joints no drainage no blood noted + cap refill<2 seconds and good radial pulses. Unable to extend all joints of the digits.

## 2023-09-20 NOTE — ED ADULT NURSE NOTE - NS ED NURSE LEVEL OF CONSCIOUSNESS ORIENTATION
DOCUMENT CREATED: 2022  1621h  NAME: Fely Cook (Girl)  CLINIC NUMBER: [id number]  ADMITTED: 2022  HOSPITAL NUMBER: [id number]  BIRTH WEIGHT: 0.860 kg (26.8 percentile)  GESTATIONAL AGE AT BIRTH: 27 1 days  DATE OF SERVICE: 2022     AGE: 55 days. POSTMENSTRUAL AGE: 35 weeks 0 days. CURRENT WEIGHT: 1.750 kg (Up   70gm) (3 lb 14 oz) (3.0 percentile). WEIGHT GAIN: 18 gm/kg/day in the past week.        VITAL SIGNS & PHYSICAL EXAM  WEIGHT: 1.750kg (3.0 percentile)  TEMP: 98-99.3. HR: 153-195. RR: . BP: 71/28-72/32 (40-47)   HEENT: Anterior fontanel soft and full. Subgaleal incision with edges   approximated and no visible drainage. Vapotherm cannula in situ, without   evidence of irritation. OG tube in situ, secured.  RESPIRATORY: Breath sounds clear with equal aeration bilaterally. Mild subcostal   retractions.  CARDIAC: Regular rate and rhythm. III/VI murmur to auscultation. +2/4 pulses   throughout. Capillary refill < 3 seconds.  ABDOMEN: Soft, round, non-tender. Positive bowel sounds.  : Normal  female features.  NEUROLOGIC: Reactive to exam. Tone appropriate for gestational age.  EXTREMITIES: Moves all extremities spontaneously. Right leg PICC in situ,   dressing secure..  SKIN: Warm, intact, color appropriate for race..     LABORATORY STUDIES  2022: CSF culture: negative  2022: CSF culture: no growth to date (rare WBCs, no organisms)     NEW FLUID INTAKE  Based on 1.750kg.  FEEDS: Similac Special Care 24 kcal/oz 11ml OG q1h  INTAKE OVER PAST 24 HOURS: 159ml/kg/d. OUTPUT OVER PAST 24 HOURS: 2.9ml/kg/hr.   COMMENTS: 120 haily/kg/day. Tolerating full enteral feeds without documented   issue. Voiding/stooling. Infant gained weight. PLANS: Projected fluids: 151   mL/kg/day. Feed all SSC 24 kcal.     CURRENT MEDICATIONS  Multivitamins with iron 0.5ml oral daily started on 2022 (completed 15   days)  Meropenem 67mg IV every 8 hours (wt adj 40mg/kg on 1.67Kg) started on  2022   (completed 2 days)     RESPIRATORY SUPPORT  SUPPORT: Vapotherm since 2022  FLOW: 4 l/min  FiO2: 0.22-0.25  O2 SATS: 87-99     CURRENT PROBLEMS & DIAGNOSES  PREMATURITY - LESS THAN 28 WEEKS  ONSET: 2022  STATUS: Active  COMMENTS: 35 weeks corrected gestational age infant. Euthermic in isolette on   ISC.  PLANS: Provide developmentally supportive care as tolerated. Transition to all   SSC 24. Follow growth velocity.  BRONCHOPULMONARY DYSPLASIA  ONSET: 2022  STATUS: Active  COMMENTS: Received on nasal CPAP +5. Minimal FiO2 requirement. Comfortable work   of breathing.  PLANS: Transition to vapotherm, 4LPM. Follow FiO2 and WOB. Follow CBG every 48   hours, due in am.  APNEA & BRADYCARDIA  ONSET: 2022  STATUS: Active  COMMENTS: 9 documented episodes in last 24 hours, 8 required tactile   stimulation.  PLANS: Follow clinically.  POST HEMORRHAGIC HYDROCEPHALUS IVH GRADE IV  ONSET: 2022  STATUS: Active  PROCEDURES: Cranial ultrasound on 2022 (Grade 2 germinal matrix hemorrhage   on the right and grade 1 germinal matrix hemorrhage on the left.); MRI scan on   2022 (Bilateral germinal matrix, intraventricular and intraparenchymal   hemorrhages with associated ventriculomegaly.); Cranial ultrasound on 2022   (Bilateral Grade IV IVH with slight increase in ventriculomegaly.); Cranial   ultrasound on 2022 (Evolving bilateral germinal matrix, intraventricular,   and intraparenchymal hemorrhages.  Clot retraction within the ventricles.   Progressive dilatation of the ventricles.  Right frontal horn now measures 13 mm   (previously 8 mm).  Left frontal horn now measures 13 mm (previously 8 mm). );   Cranial ultrasound on 2022 (Evolving bilateral germinal matrix,   intraventricular, and intraparenchymal hemorrhages.  Continued ventriculomegaly   which does not appear appreciably changed from prior.); Cranial ultrasound on   2022 (No significant detrimental change as  compared to prior exam.    Evolving bilateral germinal matrix, intraventricular, and intraparenchymal   hemorrhages with continued ventricular megaly.  Recommend continued close   follow-up.); Cranial ultrasound on 2022 (Ventriculomegaly with mild   increase in ventricular size. Stable intracranial hemorrhage.); Cranial   ultrasound on 2022 (pending ); Subgaleal shunt placement on 2022 (right   subgaleal shunt placed per ); Cranial ultrasound on 2022   (Persistent ventriculomegaly with slight decrease in ventricular size compared   to previous examination., Evolving bilateral germinal matrix, intraventricular   and intraparenchymal hemorrhage., ?); Cranial ultrasound on 2022 (Evolving   bilateral germinal matrix, intraventricular and intraparenchymal hemorrhage., ?,   Ventriculomegaly, slightly increased from 2022); Cranial ultrasound on   2022 (pending); Subgaleal shunt tap on 2022 (9mls removed and sent for   studies); Cranial ultrasound on 2022 (Stable germinal matrix   intraventricular and intraparenchymal hemorrhage with hydrocephalus unchanged   from the prior study.); Subgaleal shunt removal and replacement on 2022   (Per Dr. Real); Cranial ultrasound on 2022 (New right ventricular shunt   has been placed in the interim.  Ventricles are dilated, though decreased in   size compared to prior.  No detrimental change from yesterday); Cranial   ultrasound on 2022 (Right lateral ventricle shows continued decrease in   size.  Left lateral ventricle is stable to slightly increased in size.    Continued close follow-up advised to ensure the ventricle in is adequately   drained via the shunt., ?, There is now a tiny volume of extra-axial fluid along   the right frontal convexity.  Intraventricular and intraparenchymal hemorrhage   with cystic change not appreciably changed., ?); Cranial ultrasound on 2022   (Stable abnormal examination with no  detrimental change from prior. Chronic   germinal matrix, intraventricular, and intraparenchymal hemorrhages with cystic   change, left greater than right.  There appear to be septations in the lateral   ventricles.  CSF remains mildly echogenic.); Cranial ultrasound on 2022   (Ventricles are increased in size compared to prior as given in detail above.    New clot in the left lateral ventricle.); MRI scan on 2022 (Persistent   hemorrhagic blood products and diffuse ventriculomegaly. There is focal   decompression of right lateral ventricle surrounding right frontal catheter,   otherwise some increase in ventricular size throughout and interval increase in   intraventricular septations/cystic collections with areas of diffusion   restriction concerning for ventriculitis .).  COMMENTS: History of IVH with post hemorrhagic hydrocephalus. Initial subgaleal   placement (2/2), required replacement (2/13), secondary to Klebsiella   meningitis. Dr Real last tapped (3/2). Most recent CUS (3/2) with concern for   increasing ventricular size and possible new blood on left-side, old clot   visualized. MRI (3/3)  showed marked ventriculomegaly with posterior cerebral   edema and concerning for ventriculitis. Head circumference 31.9 cm, increased   0.9 cm - notified Peds Neurosurgery (Farhan CULLEN).  PLANS: Obtain CUS in am. Continue daily head circumference. Follow with Peds   Neurosurgery.  KLEBSIELLA SHUNT INFECTION/ MENINGITIS  ONSET: 2022  STATUS: Active  COMMENTS: Subgaleal shunt placed (2/3), with Klebsiella shunt infection. Initial   shunt removed and replaced (2/13). Multiple CSF cultures positive for   klebsiella, CSF sterilized (2/19) and remains negative (2/22 & 2/25). CSF   culture (3/2): remains no growth to date. Remains on 21 day meropenem course   from 1st negative culture (dosing weight adjusted 3/4). Peds ID following.  PLANS: Follow CSF culture (3/2) until final. Continue meropenem for 21 day    course, doses through 3/12. Continue to follow with Peds ID.  PATENT DUCTUS ARTERIOSUS  ONSET: 2022  STATUS: Active  PROCEDURES: Echocardiogram on 2022 (There is a large (3 mm) PDA with left   to right shunting. Normal LV structure and size. Normal LV systolic function.   Qualitatively RV is mildly hypertrophied with normal systolic function. RV   systolic pressure estimate moderately increased.); Echocardiogram on 2022   (PDA, left to right shunt, large. PFO., Left to right atrial shunt, small. Mild   left atrial enlargement.); Echocardiogram on 2022 (persistent large PDA   with moderate LA and mild LV enlargement.).  COMMENTS: Most recent echocardiogram () shows persistent large PDA with   moderate LA and mild LV enlargement.  PLANS: Repeat echocardiogram in am. Follow with Peds Cardiology.  ANEMIA  ONSET: 2022  STATUS: Active  PROCEDURES: PRBC transfusion (multiple) on 2022 (, , , ,   ).  COMMENTS: Remains on multivitamin therapy. Most recent hematocrit (): 36.2%.   Last transfused .  PLANS: Continue multivitamin supplementation. Repeat hematology labs on 3/12 -   need to be ordered.  RETINOPATHY OF PREMATURITY STAGE 2  ONSET: 2022  STATUS: Active  COMMENTS: Most recent eye exam (3/1) bilateral grade 2, zone 2, tortuosity OU,   nonconfluent ridge. Stable.  PLANS: Prediction: mild risk. Follow up in 2 weeks - due 3/13, needs to be   ordered.     TRACKING   SCREENING: Last study on 2022: Pending.  OPTHALMOLOGIC EXAM: Last study on 2022: Grade:  2, Zone: 2, Plus: - OU and   At mild risk, F/U in 2 weeks - due3/13.  FURTHER SCREENING: Car seat screen indicated, hearing screen indicated and   Repeat ROP screen week of 3/13.  SOCIAL COMMENTS: : PICC consent obtained from mother via phone by NNP  : Mother updated at bedside by NNP (MO). Updated on most recent CUS,   including repeat scan ordered, PDA and anemia.    - Mother  updated over the phone regarding CUS results and need for   neurosurgery consult (AE).     ATTENDING ADDENDUM  Patient seen by NNP and discussed on rounds with Erik CULLEN. Agreed with plan as   stated above.     NOTE CREATORS  DAILY ATTENDING: Ned Valencia MD  PREPARED BY: AMOL Kelsey, SANDRO-BC                 Electronically Signed by AMOL Kelsey NNP-BC on 2022 1621.           Electronically Signed by Ned Valencia MD on 2022 1807.               Oriented - self; Oriented - place; Oriented - time
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
## 2023-09-20 NOTE — CONSULT NOTE ADULT - ASSESSMENT
37M with finger infection/abscess   afebrile  leukocytosis   CXR clear   s/p debridement by plastic surgery though likely needs more   blood cultures were sent   abscess cultures sent   tdap has been given     Plan:  Unasyn 3g q6hrs   vancomycin 1250mg q8hrs   send vancomycin trough with target AUC/HAIR 400-600   (therapeutic drug monitoring required with IV vancomycin)   follow abscess cultures   follow blood cultures   hand elevation   pain control  monitor for ETOH withdrawal     Discussed with Dr. Raj Lopez, DO  Infectious Disease Attending  Reachable via Microsoft Teams or ID office: [phone]  After 5pm/weekends please call [phone] for all inquiries and new consults

## 2023-09-20 NOTE — H&P ADULT - PROBLEM SELECTOR PLAN 2
Seen by plastic surgery, s/p I&D at bedside in ED. Noted abscess. Will need further OR for multiple fracture, tendons injury repair  Continue vanco and Unasyn  Follow up wound culture result  ID consulted
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
## 2023-09-20 NOTE — PATIENT PROFILE ADULT - FUNCTIONAL ASSESSMENT - DAILY ACTIVITY 2.
Called [facility] P:[phone] and spoke with Clair. The patient has refills on file. She confirmed and is filling the enalapril 5mg tablets  Called and spoke to Mom and let her know the enalapril was being filled. Mom verbalized understanding.   
Mom needs refill on Enalapril 5mg called in to [facility] # [phone], she is completely out.  Mom can be reached at [phone].  
4 = No assist / stand by assistance

## 2023-09-20 NOTE — H&P ADULT - NSHPPHYSICALEXAM_GEN_ALL_CORE
CONSTITUTIONAL: alert and cooperative, moderate distress due to pain  EYES: PERRL, no scleral icterus  ENT: Mucosa moist, tongue normal  NECK: Neck supple, trachea midline, no masses or thyromegaly.  CARDIAC: Normal S1 and S2. Regular rate and rhythms. No Pedal edema. Peripheral pulses intact  LUNGS: Equal air entry both lungs. No rales, rhonchi, wheezing. Normal respiratory effort.   ABDOMEN: Soft, nondistended, nontender. No guarding or rebound tenderness. No hepatomegaly or splenomegaly. Bowel sound normal.   MUSCULOSKELETAL: Normocephalic, atraumatic. Right hand dressing in place ( I did not open the dressing given just had I &D by plastic surgery)  NEUROLOGICAL: No gross motor or sensory deficits  SKIN: no lesions or eruptions. Normal turgor  PSYCHIATRIC: A&O x 3, appropriate mood and affect.

## 2023-09-20 NOTE — SBIRT NOTE ADULT - NSSBIRTALCPOSREINDET_GEN_A_CORE
Provided SBIRT services: Full screen Negative. Positive reinforcement provided given patient currently within healthy guidelines. Education materials reviewed and given to patient. Provided SBIRT services: Full screen Negative. Positive reinforcement provided given patient currently within healthy guidelines. Education materials reviewed and given to patient.    Pt reports tobacco use. States he stopped "sometime this year" but could not pinpoint when. Reports he is doing well without NRT.
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
## 2023-09-20 NOTE — ED ADULT NURSE REASSESSMENT NOTE - NS ED NURSE REASSESS COMMENT FT1
Patient seen and evaluated by Dr Osuna, stitches removed by Dr Osuna under local anesthesia, noted some pus and blood coming out of the rt fingers and hand while Dr Osuna doing the procedure.
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
## 2023-09-20 NOTE — ED PROVIDER NOTE - CONSULTING PHYSICIAN
Dr. Anderson hand surgeon is called again ands sts start pt on unasyn and vanco ivpb and admit pt to medicine

## 2023-09-21 LAB
ALBUMIN SERPL ELPH-MCNC: 2.9 G/DL — LOW (ref 3.3–5)
ALP SERPL-CCNC: 62 U/L — SIGNIFICANT CHANGE UP (ref 40–120)
ALT FLD-CCNC: 14 U/L — SIGNIFICANT CHANGE UP (ref 12–78)
ANION GAP SERPL CALC-SCNC: 4 MMOL/L — LOW (ref 5–17)
AST SERPL-CCNC: 13 U/L — LOW (ref 15–37)
BILIRUB SERPL-MCNC: 0.6 MG/DL — SIGNIFICANT CHANGE UP (ref 0.2–1.2)
BUN SERPL-MCNC: 8 MG/DL — SIGNIFICANT CHANGE UP (ref 7–23)
CALCIUM SERPL-MCNC: 8.5 MG/DL — SIGNIFICANT CHANGE UP (ref 8.5–10.1)
CHLORIDE SERPL-SCNC: 105 MMOL/L — SIGNIFICANT CHANGE UP (ref 96–108)
CO2 SERPL-SCNC: 30 MMOL/L — SIGNIFICANT CHANGE UP (ref 22–31)
CREAT SERPL-MCNC: 0.86 MG/DL — SIGNIFICANT CHANGE UP (ref 0.5–1.3)
CRP SERPL-MCNC: 91 MG/L — HIGH
EGFR: 116 ML/MIN/1.73M2 — SIGNIFICANT CHANGE UP
GLUCOSE SERPL-MCNC: 101 MG/DL — HIGH (ref 70–99)
HCT VFR BLD CALC: 35.7 % — LOW (ref 39–50)
HGB BLD-MCNC: 12.1 G/DL — LOW (ref 13–17)
MAGNESIUM SERPL-MCNC: 1.6 MG/DL — SIGNIFICANT CHANGE UP (ref 1.6–2.6)
MCHC RBC-ENTMCNC: 30.1 PG — SIGNIFICANT CHANGE UP (ref 27–34)
MCHC RBC-ENTMCNC: 33.9 G/DL — SIGNIFICANT CHANGE UP (ref 32–36)
MCV RBC AUTO: 88.8 FL — SIGNIFICANT CHANGE UP (ref 80–100)
NRBC # BLD: 0 /100 WBCS — SIGNIFICANT CHANGE UP (ref 0–0)
PHOSPHATE SERPL-MCNC: 2.6 MG/DL — SIGNIFICANT CHANGE UP (ref 2.5–4.5)
PLATELET # BLD AUTO: 120 K/UL — LOW (ref 150–400)
POTASSIUM SERPL-MCNC: 3.6 MMOL/L — SIGNIFICANT CHANGE UP (ref 3.5–5.3)
POTASSIUM SERPL-SCNC: 3.6 MMOL/L — SIGNIFICANT CHANGE UP (ref 3.5–5.3)
PROT SERPL-MCNC: 6.2 GM/DL — SIGNIFICANT CHANGE UP (ref 6–8.3)
RBC # BLD: 4.02 M/UL — LOW (ref 4.2–5.8)
RBC # FLD: 13.7 % — SIGNIFICANT CHANGE UP (ref 10.3–14.5)
SODIUM SERPL-SCNC: 139 MMOL/L — SIGNIFICANT CHANGE UP (ref 135–145)
VANCOMYCIN TROUGH SERPL-MCNC: 9.1 UG/ML — LOW (ref 10–20)
WBC # BLD: 5.53 K/UL — SIGNIFICANT CHANGE UP (ref 3.8–10.5)
WBC # FLD AUTO: 5.53 K/UL — SIGNIFICANT CHANGE UP (ref 3.8–10.5)

## 2023-09-21 PROCEDURE — 99233 SBSQ HOSP IP/OBS HIGH 50: CPT

## 2023-09-21 PROCEDURE — 99232 SBSQ HOSP IP/OBS MODERATE 35: CPT

## 2023-09-21 RX ORDER — HYDRALAZINE HCL 50 MG
25 TABLET ORAL EVERY 8 HOURS
Refills: 0 | Status: DISCONTINUED | OUTPATIENT
Start: 2023-09-21 | End: 2023-09-28

## 2023-09-21 RX ORDER — KETOROLAC TROMETHAMINE 30 MG/ML
15 SYRINGE (ML) INJECTION ONCE
Refills: 0 | Status: DISCONTINUED | OUTPATIENT
Start: 2023-09-21 | End: 2023-09-21

## 2023-09-21 RX ADMIN — MORPHINE SULFATE 2 MILLIGRAM(S): 50 CAPSULE, EXTENDED RELEASE ORAL at 20:31

## 2023-09-21 RX ADMIN — HYDROMORPHONE HYDROCHLORIDE 0.5 MILLIGRAM(S): 2 INJECTION INTRAMUSCULAR; INTRAVENOUS; SUBCUTANEOUS at 10:30

## 2023-09-21 RX ADMIN — AMPICILLIN SODIUM AND SULBACTAM SODIUM 200 GRAM(S): 250; 125 INJECTION, POWDER, FOR SUSPENSION INTRAMUSCULAR; INTRAVENOUS at 05:19

## 2023-09-21 RX ADMIN — MORPHINE SULFATE 2 MILLIGRAM(S): 50 CAPSULE, EXTENDED RELEASE ORAL at 06:07

## 2023-09-21 RX ADMIN — HYDROMORPHONE HYDROCHLORIDE 0.5 MILLIGRAM(S): 2 INJECTION INTRAMUSCULAR; INTRAVENOUS; SUBCUTANEOUS at 15:45

## 2023-09-21 RX ADMIN — Medication 15 MILLIGRAM(S): at 17:13

## 2023-09-21 RX ADMIN — HYDROMORPHONE HYDROCHLORIDE 0.5 MILLIGRAM(S): 2 INJECTION INTRAMUSCULAR; INTRAVENOUS; SUBCUTANEOUS at 09:36

## 2023-09-21 RX ADMIN — AMPICILLIN SODIUM AND SULBACTAM SODIUM 200 GRAM(S): 250; 125 INJECTION, POWDER, FOR SUSPENSION INTRAMUSCULAR; INTRAVENOUS at 17:12

## 2023-09-21 RX ADMIN — OXYCODONE HYDROCHLORIDE 5 MILLIGRAM(S): 5 TABLET ORAL at 14:01

## 2023-09-21 RX ADMIN — MORPHINE SULFATE 2 MILLIGRAM(S): 50 CAPSULE, EXTENDED RELEASE ORAL at 07:00

## 2023-09-21 RX ADMIN — Medication 166.67 MILLIGRAM(S): at 15:04

## 2023-09-21 RX ADMIN — Medication 166.67 MILLIGRAM(S): at 22:10

## 2023-09-21 RX ADMIN — MORPHINE SULFATE 2 MILLIGRAM(S): 50 CAPSULE, EXTENDED RELEASE ORAL at 01:49

## 2023-09-21 RX ADMIN — HYDROMORPHONE HYDROCHLORIDE 0.5 MILLIGRAM(S): 2 INJECTION INTRAMUSCULAR; INTRAVENOUS; SUBCUTANEOUS at 16:21

## 2023-09-21 RX ADMIN — HYDROMORPHONE HYDROCHLORIDE 0.5 MILLIGRAM(S): 2 INJECTION INTRAMUSCULAR; INTRAVENOUS; SUBCUTANEOUS at 03:35

## 2023-09-21 RX ADMIN — Medication 166.67 MILLIGRAM(S): at 06:08

## 2023-09-21 RX ADMIN — HYDROMORPHONE HYDROCHLORIDE 0.5 MILLIGRAM(S): 2 INJECTION INTRAMUSCULAR; INTRAVENOUS; SUBCUTANEOUS at 04:30

## 2023-09-21 RX ADMIN — OXYCODONE HYDROCHLORIDE 5 MILLIGRAM(S): 5 TABLET ORAL at 14:53

## 2023-09-21 RX ADMIN — MORPHINE SULFATE 2 MILLIGRAM(S): 50 CAPSULE, EXTENDED RELEASE ORAL at 21:15

## 2023-09-21 RX ADMIN — Medication 15 MILLIGRAM(S): at 18:10

## 2023-09-21 RX ADMIN — Medication 25 MILLIGRAM(S): at 22:08

## 2023-09-21 RX ADMIN — SENNA PLUS 2 TABLET(S): 8.6 TABLET ORAL at 22:07

## 2023-09-21 RX ADMIN — AMPICILLIN SODIUM AND SULBACTAM SODIUM 200 GRAM(S): 250; 125 INJECTION, POWDER, FOR SUSPENSION INTRAMUSCULAR; INTRAVENOUS at 13:58

## 2023-09-21 RX ADMIN — MORPHINE SULFATE 2 MILLIGRAM(S): 50 CAPSULE, EXTENDED RELEASE ORAL at 02:40

## 2023-09-21 RX ADMIN — Medication 25 MILLIGRAM(S): at 15:45

## 2023-09-21 RX ADMIN — Medication 3 MILLIGRAM(S): at 22:08

## 2023-09-21 NOTE — PROGRESS NOTE ADULT - ASSESSMENT
37M with finger infection/abscess   afebrile  leukocytosis   CXR clear   s/p debridement by plastic surgery though likely needs more   blood cultures were sent   abscess cultures sent   tdap has been given     9/21:continue antibiotics, pain control, surgery next week, follow cultures     Plan:  Unasyn 3g q6hrs   vancomycin 1250mg q8hrs   send vancomycin trough with target AUC/HAIR 400-600   (therapeutic drug monitoring required with IV vancomycin)   follow abscess cultures   follow blood cultures   hand elevation   pain control  monitor for ETOH withdrawal     Discussed with primary team    Hany Lopez, DO  Infectious Disease Attending  Reachable via Microsoft Teams or ID office: [phone]  After 5pm/weekends please call [phone] for all inquiries and new consults

## 2023-09-21 NOTE — PROVIDER CONTACT NOTE (CRITICAL VALUE NOTIFICATION) - TEST AND RESULT REPORTED:
Tissue c/s collected on 9/20 gram stain: Few polymorphonuclear leucocytes and preliminary - Moderate staph aureus

## 2023-09-22 LAB
-  AMPICILLIN/SULBACTAM: SIGNIFICANT CHANGE UP
-  CEFAZOLIN: SIGNIFICANT CHANGE UP
-  CLINDAMYCIN: SIGNIFICANT CHANGE UP
-  ERYTHROMYCIN: SIGNIFICANT CHANGE UP
-  GENTAMICIN: SIGNIFICANT CHANGE UP
-  OXACILLIN: SIGNIFICANT CHANGE UP
-  PENICILLIN: SIGNIFICANT CHANGE UP
-  RIFAMPIN: SIGNIFICANT CHANGE UP
-  TETRACYCLINE: SIGNIFICANT CHANGE UP
-  TRIMETHOPRIM/SULFAMETHOXAZOLE: SIGNIFICANT CHANGE UP
-  VANCOMYCIN: SIGNIFICANT CHANGE UP
ALBUMIN SERPL ELPH-MCNC: 2.9 G/DL — LOW (ref 3.3–5)
ALP SERPL-CCNC: 66 U/L — SIGNIFICANT CHANGE UP (ref 40–120)
ALT FLD-CCNC: 16 U/L — SIGNIFICANT CHANGE UP (ref 12–78)
ANION GAP SERPL CALC-SCNC: 5 MMOL/L — SIGNIFICANT CHANGE UP (ref 5–17)
AST SERPL-CCNC: 14 U/L — LOW (ref 15–37)
BILIRUB SERPL-MCNC: 0.5 MG/DL — SIGNIFICANT CHANGE UP (ref 0.2–1.2)
BUN SERPL-MCNC: 7 MG/DL — SIGNIFICANT CHANGE UP (ref 7–23)
CALCIUM SERPL-MCNC: 8.9 MG/DL — SIGNIFICANT CHANGE UP (ref 8.5–10.1)
CHLORIDE SERPL-SCNC: 101 MMOL/L — SIGNIFICANT CHANGE UP (ref 96–108)
CO2 SERPL-SCNC: 31 MMOL/L — SIGNIFICANT CHANGE UP (ref 22–31)
CREAT SERPL-MCNC: 0.94 MG/DL — SIGNIFICANT CHANGE UP (ref 0.5–1.3)
CULTURE RESULTS: NO GROWTH — SIGNIFICANT CHANGE UP
EGFR: 108 ML/MIN/1.73M2 — SIGNIFICANT CHANGE UP
GLUCOSE SERPL-MCNC: 84 MG/DL — SIGNIFICANT CHANGE UP (ref 70–99)
HCT VFR BLD CALC: 39.4 % — SIGNIFICANT CHANGE UP (ref 39–50)
HGB BLD-MCNC: 13.4 G/DL — SIGNIFICANT CHANGE UP (ref 13–17)
MCHC RBC-ENTMCNC: 30.1 PG — SIGNIFICANT CHANGE UP (ref 27–34)
MCHC RBC-ENTMCNC: 34 G/DL — SIGNIFICANT CHANGE UP (ref 32–36)
MCV RBC AUTO: 88.5 FL — SIGNIFICANT CHANGE UP (ref 80–100)
METHOD TYPE: SIGNIFICANT CHANGE UP
NRBC # BLD: 0 /100 WBCS — SIGNIFICANT CHANGE UP (ref 0–0)
PLATELET # BLD AUTO: 132 K/UL — LOW (ref 150–400)
POTASSIUM SERPL-MCNC: 3.8 MMOL/L — SIGNIFICANT CHANGE UP (ref 3.5–5.3)
POTASSIUM SERPL-SCNC: 3.8 MMOL/L — SIGNIFICANT CHANGE UP (ref 3.5–5.3)
PROT SERPL-MCNC: 6.5 GM/DL — SIGNIFICANT CHANGE UP (ref 6–8.3)
RBC # BLD: 4.45 M/UL — SIGNIFICANT CHANGE UP (ref 4.2–5.8)
RBC # FLD: 13.5 % — SIGNIFICANT CHANGE UP (ref 10.3–14.5)
SODIUM SERPL-SCNC: 137 MMOL/L — SIGNIFICANT CHANGE UP (ref 135–145)
SPECIMEN SOURCE: SIGNIFICANT CHANGE UP
VANCOMYCIN TROUGH SERPL-MCNC: 14.8 UG/ML — SIGNIFICANT CHANGE UP (ref 10–20)
WBC # BLD: 5.85 K/UL — SIGNIFICANT CHANGE UP (ref 3.8–10.5)
WBC # FLD AUTO: 5.85 K/UL — SIGNIFICANT CHANGE UP (ref 3.8–10.5)

## 2023-09-22 PROCEDURE — 99232 SBSQ HOSP IP/OBS MODERATE 35: CPT

## 2023-09-22 RX ORDER — HYDROMORPHONE HYDROCHLORIDE 2 MG/ML
0.5 INJECTION INTRAMUSCULAR; INTRAVENOUS; SUBCUTANEOUS ONCE
Refills: 0 | Status: DISCONTINUED | OUTPATIENT
Start: 2023-09-22 | End: 2023-09-22

## 2023-09-22 RX ADMIN — HYDROMORPHONE HYDROCHLORIDE 0.5 MILLIGRAM(S): 2 INJECTION INTRAMUSCULAR; INTRAVENOUS; SUBCUTANEOUS at 03:47

## 2023-09-22 RX ADMIN — MORPHINE SULFATE 2 MILLIGRAM(S): 50 CAPSULE, EXTENDED RELEASE ORAL at 18:53

## 2023-09-22 RX ADMIN — Medication 650 MILLIGRAM(S): at 20:59

## 2023-09-22 RX ADMIN — MORPHINE SULFATE 2 MILLIGRAM(S): 50 CAPSULE, EXTENDED RELEASE ORAL at 23:36

## 2023-09-22 RX ADMIN — SENNA PLUS 2 TABLET(S): 8.6 TABLET ORAL at 21:29

## 2023-09-22 RX ADMIN — Medication 25 MILLIGRAM(S): at 14:07

## 2023-09-22 RX ADMIN — HYDROMORPHONE HYDROCHLORIDE 0.5 MILLIGRAM(S): 2 INJECTION INTRAMUSCULAR; INTRAVENOUS; SUBCUTANEOUS at 12:30

## 2023-09-22 RX ADMIN — MORPHINE SULFATE 2 MILLIGRAM(S): 50 CAPSULE, EXTENDED RELEASE ORAL at 13:49

## 2023-09-22 RX ADMIN — MORPHINE SULFATE 2 MILLIGRAM(S): 50 CAPSULE, EXTENDED RELEASE ORAL at 09:15

## 2023-09-22 RX ADMIN — OXYCODONE HYDROCHLORIDE 5 MILLIGRAM(S): 5 TABLET ORAL at 07:17

## 2023-09-22 RX ADMIN — HYDROMORPHONE HYDROCHLORIDE 0.5 MILLIGRAM(S): 2 INJECTION INTRAMUSCULAR; INTRAVENOUS; SUBCUTANEOUS at 20:21

## 2023-09-22 RX ADMIN — Medication 166.67 MILLIGRAM(S): at 15:08

## 2023-09-22 RX ADMIN — AMPICILLIN SODIUM AND SULBACTAM SODIUM 200 GRAM(S): 250; 125 INJECTION, POWDER, FOR SUSPENSION INTRAMUSCULAR; INTRAVENOUS at 17:53

## 2023-09-22 RX ADMIN — MORPHINE SULFATE 2 MILLIGRAM(S): 50 CAPSULE, EXTENDED RELEASE ORAL at 14:49

## 2023-09-22 RX ADMIN — MORPHINE SULFATE 2 MILLIGRAM(S): 50 CAPSULE, EXTENDED RELEASE ORAL at 17:53

## 2023-09-22 RX ADMIN — Medication 25 MILLIGRAM(S): at 21:25

## 2023-09-22 RX ADMIN — Medication 166.67 MILLIGRAM(S): at 21:48

## 2023-09-22 RX ADMIN — Medication 3 MILLIGRAM(S): at 23:36

## 2023-09-22 RX ADMIN — AMPICILLIN SODIUM AND SULBACTAM SODIUM 200 GRAM(S): 250; 125 INJECTION, POWDER, FOR SUSPENSION INTRAMUSCULAR; INTRAVENOUS at 11:54

## 2023-09-22 RX ADMIN — Medication 650 MILLIGRAM(S): at 19:59

## 2023-09-22 RX ADMIN — HYDROMORPHONE HYDROCHLORIDE 0.5 MILLIGRAM(S): 2 INJECTION INTRAMUSCULAR; INTRAVENOUS; SUBCUTANEOUS at 02:57

## 2023-09-22 RX ADMIN — Medication 166.67 MILLIGRAM(S): at 06:03

## 2023-09-22 RX ADMIN — Medication 25 MILLIGRAM(S): at 06:04

## 2023-09-22 RX ADMIN — OXYCODONE HYDROCHLORIDE 5 MILLIGRAM(S): 5 TABLET ORAL at 07:56

## 2023-09-22 RX ADMIN — OXYCODONE HYDROCHLORIDE 5 MILLIGRAM(S): 5 TABLET ORAL at 16:27

## 2023-09-22 RX ADMIN — HYDROMORPHONE HYDROCHLORIDE 0.5 MILLIGRAM(S): 2 INJECTION INTRAMUSCULAR; INTRAVENOUS; SUBCUTANEOUS at 21:20

## 2023-09-22 RX ADMIN — AMPICILLIN SODIUM AND SULBACTAM SODIUM 200 GRAM(S): 250; 125 INJECTION, POWDER, FOR SUSPENSION INTRAMUSCULAR; INTRAVENOUS at 00:07

## 2023-09-22 RX ADMIN — HYDROMORPHONE HYDROCHLORIDE 0.5 MILLIGRAM(S): 2 INJECTION INTRAMUSCULAR; INTRAVENOUS; SUBCUTANEOUS at 11:30

## 2023-09-22 RX ADMIN — OXYCODONE HYDROCHLORIDE 5 MILLIGRAM(S): 5 TABLET ORAL at 17:27

## 2023-09-22 RX ADMIN — AMPICILLIN SODIUM AND SULBACTAM SODIUM 200 GRAM(S): 250; 125 INJECTION, POWDER, FOR SUSPENSION INTRAMUSCULAR; INTRAVENOUS at 06:03

## 2023-09-22 RX ADMIN — MORPHINE SULFATE 2 MILLIGRAM(S): 50 CAPSULE, EXTENDED RELEASE ORAL at 08:15

## 2023-09-22 RX ADMIN — AMPICILLIN SODIUM AND SULBACTAM SODIUM 200 GRAM(S): 250; 125 INJECTION, POWDER, FOR SUSPENSION INTRAMUSCULAR; INTRAVENOUS at 23:34

## 2023-09-22 NOTE — PHARMACOTHERAPY INTERVENTION NOTE - COMMENTS
Patient on vancomycin 1250mg q8h with calculated AUC of 507. Recommended repeat trough before the next dose to confirm.

## 2023-09-22 NOTE — PROGRESS NOTE ADULT - ASSESSMENT
37M with finger infection/abscess   afebrile  leukocytosis   CXR clear   s/p debridement by plastic surgery though likely needs more   blood cultures were sent   abscess cultures sent   tdap has been given     9/21:continue antibiotics, pain control, surgery next week, follow cultures   9/22: no fevers, RA, no wbc, Cr ok, BCs with no growth to date, tissue culture is growing Moderate Staphylococcus aureus, awaiting for sensitivity. Will continue with IV Vancomycin and IV Unasyn for now, Vanco level is 14.8 today. Possibly back to OR next week.     Plan:  Unasyn 3g q6hrs   vancomycin 1250mg q8hrs   send vancomycin trough with target AUC/HAIR 400-600   (therapeutic drug monitoring required with IV vancomycin)   follow abscess cultures - staph aureus, pending sensitivity   follow blood cultures NGTD  hand elevation   pain control  monitor for ETOH withdrawal

## 2023-09-23 DIAGNOSIS — K59.03 DRUG INDUCED CONSTIPATION: ICD-10-CM

## 2023-09-23 PROCEDURE — 99233 SBSQ HOSP IP/OBS HIGH 50: CPT

## 2023-09-23 RX ORDER — LACTULOSE 10 G/15ML
10 SOLUTION ORAL THREE TIMES A DAY
Refills: 0 | Status: DISCONTINUED | OUTPATIENT
Start: 2023-09-23 | End: 2023-09-28

## 2023-09-23 RX ADMIN — AMPICILLIN SODIUM AND SULBACTAM SODIUM 200 GRAM(S): 250; 125 INJECTION, POWDER, FOR SUSPENSION INTRAMUSCULAR; INTRAVENOUS at 11:38

## 2023-09-23 RX ADMIN — MORPHINE SULFATE 2 MILLIGRAM(S): 50 CAPSULE, EXTENDED RELEASE ORAL at 16:18

## 2023-09-23 RX ADMIN — MORPHINE SULFATE 2 MILLIGRAM(S): 50 CAPSULE, EXTENDED RELEASE ORAL at 10:43

## 2023-09-23 RX ADMIN — Medication 25 MILLIGRAM(S): at 21:20

## 2023-09-23 RX ADMIN — MORPHINE SULFATE 2 MILLIGRAM(S): 50 CAPSULE, EXTENDED RELEASE ORAL at 19:44

## 2023-09-23 RX ADMIN — OXYCODONE HYDROCHLORIDE 5 MILLIGRAM(S): 5 TABLET ORAL at 12:24

## 2023-09-23 RX ADMIN — MORPHINE SULFATE 2 MILLIGRAM(S): 50 CAPSULE, EXTENDED RELEASE ORAL at 09:59

## 2023-09-23 RX ADMIN — OXYCODONE HYDROCHLORIDE 5 MILLIGRAM(S): 5 TABLET ORAL at 07:02

## 2023-09-23 RX ADMIN — Medication 166.67 MILLIGRAM(S): at 05:09

## 2023-09-23 RX ADMIN — MORPHINE SULFATE 2 MILLIGRAM(S): 50 CAPSULE, EXTENDED RELEASE ORAL at 00:32

## 2023-09-23 RX ADMIN — MORPHINE SULFATE 2 MILLIGRAM(S): 50 CAPSULE, EXTENDED RELEASE ORAL at 15:22

## 2023-09-23 RX ADMIN — MORPHINE SULFATE 2 MILLIGRAM(S): 50 CAPSULE, EXTENDED RELEASE ORAL at 20:42

## 2023-09-23 RX ADMIN — OXYCODONE HYDROCHLORIDE 5 MILLIGRAM(S): 5 TABLET ORAL at 18:26

## 2023-09-23 RX ADMIN — LACTULOSE 10 GRAM(S): 10 SOLUTION ORAL at 13:46

## 2023-09-23 RX ADMIN — Medication 25 MILLIGRAM(S): at 05:09

## 2023-09-23 RX ADMIN — LACTULOSE 10 GRAM(S): 10 SOLUTION ORAL at 21:20

## 2023-09-23 RX ADMIN — OXYCODONE HYDROCHLORIDE 5 MILLIGRAM(S): 5 TABLET ORAL at 06:03

## 2023-09-23 RX ADMIN — Medication 25 MILLIGRAM(S): at 13:46

## 2023-09-23 RX ADMIN — OXYCODONE HYDROCHLORIDE 5 MILLIGRAM(S): 5 TABLET ORAL at 13:20

## 2023-09-23 RX ADMIN — AMPICILLIN SODIUM AND SULBACTAM SODIUM 200 GRAM(S): 250; 125 INJECTION, POWDER, FOR SUSPENSION INTRAMUSCULAR; INTRAVENOUS at 23:15

## 2023-09-23 RX ADMIN — OXYCODONE HYDROCHLORIDE 5 MILLIGRAM(S): 5 TABLET ORAL at 23:54

## 2023-09-23 RX ADMIN — SENNA PLUS 2 TABLET(S): 8.6 TABLET ORAL at 21:20

## 2023-09-23 RX ADMIN — AMPICILLIN SODIUM AND SULBACTAM SODIUM 200 GRAM(S): 250; 125 INJECTION, POWDER, FOR SUSPENSION INTRAMUSCULAR; INTRAVENOUS at 17:52

## 2023-09-23 RX ADMIN — AMPICILLIN SODIUM AND SULBACTAM SODIUM 200 GRAM(S): 250; 125 INJECTION, POWDER, FOR SUSPENSION INTRAMUSCULAR; INTRAVENOUS at 05:08

## 2023-09-23 RX ADMIN — OXYCODONE HYDROCHLORIDE 5 MILLIGRAM(S): 5 TABLET ORAL at 19:25
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
## 2023-09-24 LAB
ANION GAP SERPL CALC-SCNC: 5 MMOL/L — SIGNIFICANT CHANGE UP (ref 5–17)
BUN SERPL-MCNC: 10 MG/DL — SIGNIFICANT CHANGE UP (ref 7–23)
CALCIUM SERPL-MCNC: 8.8 MG/DL — SIGNIFICANT CHANGE UP (ref 8.5–10.1)
CHLORIDE SERPL-SCNC: 104 MMOL/L — SIGNIFICANT CHANGE UP (ref 96–108)
CO2 SERPL-SCNC: 29 MMOL/L — SIGNIFICANT CHANGE UP (ref 22–31)
CREAT SERPL-MCNC: 0.91 MG/DL — SIGNIFICANT CHANGE UP (ref 0.5–1.3)
EGFR: 113 ML/MIN/1.73M2 — SIGNIFICANT CHANGE UP
GLUCOSE SERPL-MCNC: 82 MG/DL — SIGNIFICANT CHANGE UP (ref 70–99)
HCT VFR BLD CALC: 36.7 % — LOW (ref 39–50)
HGB BLD-MCNC: 12.1 G/DL — LOW (ref 13–17)
MAGNESIUM SERPL-MCNC: 2.2 MG/DL — SIGNIFICANT CHANGE UP (ref 1.6–2.6)
MCHC RBC-ENTMCNC: 29.6 PG — SIGNIFICANT CHANGE UP (ref 27–34)
MCHC RBC-ENTMCNC: 33 G/DL — SIGNIFICANT CHANGE UP (ref 32–36)
MCV RBC AUTO: 89.7 FL — SIGNIFICANT CHANGE UP (ref 80–100)
NRBC # BLD: 0 /100 WBCS — SIGNIFICANT CHANGE UP (ref 0–0)
PHOSPHATE SERPL-MCNC: 3.8 MG/DL — SIGNIFICANT CHANGE UP (ref 2.5–4.5)
PLATELET # BLD AUTO: 161 K/UL — SIGNIFICANT CHANGE UP (ref 150–400)
POTASSIUM SERPL-MCNC: 3.8 MMOL/L — SIGNIFICANT CHANGE UP (ref 3.5–5.3)
POTASSIUM SERPL-SCNC: 3.8 MMOL/L — SIGNIFICANT CHANGE UP (ref 3.5–5.3)
RBC # BLD: 4.09 M/UL — LOW (ref 4.2–5.8)
RBC # FLD: 13.3 % — SIGNIFICANT CHANGE UP (ref 10.3–14.5)
SODIUM SERPL-SCNC: 138 MMOL/L — SIGNIFICANT CHANGE UP (ref 135–145)
WBC # BLD: 4.24 K/UL — SIGNIFICANT CHANGE UP (ref 3.8–10.5)
WBC # FLD AUTO: 4.24 K/UL — SIGNIFICANT CHANGE UP (ref 3.8–10.5)

## 2023-09-24 PROCEDURE — 99233 SBSQ HOSP IP/OBS HIGH 50: CPT

## 2023-09-24 RX ADMIN — MORPHINE SULFATE 2 MILLIGRAM(S): 50 CAPSULE, EXTENDED RELEASE ORAL at 10:12

## 2023-09-24 RX ADMIN — OXYCODONE HYDROCHLORIDE 5 MILLIGRAM(S): 5 TABLET ORAL at 16:33

## 2023-09-24 RX ADMIN — LACTULOSE 10 GRAM(S): 10 SOLUTION ORAL at 22:09

## 2023-09-24 RX ADMIN — MORPHINE SULFATE 2 MILLIGRAM(S): 50 CAPSULE, EXTENDED RELEASE ORAL at 22:09

## 2023-09-24 RX ADMIN — AMPICILLIN SODIUM AND SULBACTAM SODIUM 200 GRAM(S): 250; 125 INJECTION, POWDER, FOR SUSPENSION INTRAMUSCULAR; INTRAVENOUS at 23:34

## 2023-09-24 RX ADMIN — AMPICILLIN SODIUM AND SULBACTAM SODIUM 200 GRAM(S): 250; 125 INJECTION, POWDER, FOR SUSPENSION INTRAMUSCULAR; INTRAVENOUS at 17:23

## 2023-09-24 RX ADMIN — Medication 25 MILLIGRAM(S): at 05:47

## 2023-09-24 RX ADMIN — MORPHINE SULFATE 2 MILLIGRAM(S): 50 CAPSULE, EXTENDED RELEASE ORAL at 01:04

## 2023-09-24 RX ADMIN — SENNA PLUS 2 TABLET(S): 8.6 TABLET ORAL at 22:09

## 2023-09-24 RX ADMIN — OXYCODONE HYDROCHLORIDE 5 MILLIGRAM(S): 5 TABLET ORAL at 09:30

## 2023-09-24 RX ADMIN — MORPHINE SULFATE 2 MILLIGRAM(S): 50 CAPSULE, EXTENDED RELEASE ORAL at 06:53

## 2023-09-24 RX ADMIN — MORPHINE SULFATE 2 MILLIGRAM(S): 50 CAPSULE, EXTENDED RELEASE ORAL at 05:53

## 2023-09-24 RX ADMIN — OXYCODONE HYDROCHLORIDE 5 MILLIGRAM(S): 5 TABLET ORAL at 00:50

## 2023-09-24 RX ADMIN — OXYCODONE HYDROCHLORIDE 5 MILLIGRAM(S): 5 TABLET ORAL at 17:25

## 2023-09-24 RX ADMIN — MORPHINE SULFATE 2 MILLIGRAM(S): 50 CAPSULE, EXTENDED RELEASE ORAL at 18:13

## 2023-09-24 RX ADMIN — Medication 25 MILLIGRAM(S): at 22:09

## 2023-09-24 RX ADMIN — OXYCODONE HYDROCHLORIDE 5 MILLIGRAM(S): 5 TABLET ORAL at 08:51

## 2023-09-24 RX ADMIN — LACTULOSE 10 GRAM(S): 10 SOLUTION ORAL at 05:47

## 2023-09-24 RX ADMIN — MORPHINE SULFATE 2 MILLIGRAM(S): 50 CAPSULE, EXTENDED RELEASE ORAL at 23:09

## 2023-09-24 RX ADMIN — AMPICILLIN SODIUM AND SULBACTAM SODIUM 200 GRAM(S): 250; 125 INJECTION, POWDER, FOR SUSPENSION INTRAMUSCULAR; INTRAVENOUS at 05:53

## 2023-09-24 RX ADMIN — MORPHINE SULFATE 2 MILLIGRAM(S): 50 CAPSULE, EXTENDED RELEASE ORAL at 10:30

## 2023-09-24 RX ADMIN — MORPHINE SULFATE 2 MILLIGRAM(S): 50 CAPSULE, EXTENDED RELEASE ORAL at 02:04

## 2023-09-24 RX ADMIN — MORPHINE SULFATE 2 MILLIGRAM(S): 50 CAPSULE, EXTENDED RELEASE ORAL at 19:13

## 2023-09-24 RX ADMIN — AMPICILLIN SODIUM AND SULBACTAM SODIUM 200 GRAM(S): 250; 125 INJECTION, POWDER, FOR SUSPENSION INTRAMUSCULAR; INTRAVENOUS at 11:37

## 2023-09-24 RX ADMIN — Medication 25 MILLIGRAM(S): at 14:05

## 2023-09-24 NOTE — CHART NOTE - NSCHARTNOTEFT_GEN_A_CORE
To whom it may concern    Mr Chaitanya Goetz ( 1985) is currently hospitalized at [facility] and is expected to require ongoing hospitalization through the coming week. Due to patient privacy laws, we are unable to provide details of his current medical condition. The hospitalist group can be reached at [phone] with any further questions.    Respectfully,  Dr. Arturo Onelil
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
## 2023-09-25 ENCOUNTER — TRANSCRIPTION ENCOUNTER (OUTPATIENT)
Age: 38
End: 2023-09-25

## 2023-09-25 LAB
CULTURE RESULTS: SIGNIFICANT CHANGE UP
GRAM STN FLD: SIGNIFICANT CHANGE UP
ORGANISM # SPEC MICROSCOPIC CNT: SIGNIFICANT CHANGE UP
ORGANISM # SPEC MICROSCOPIC CNT: SIGNIFICANT CHANGE UP
SPECIMEN SOURCE: SIGNIFICANT CHANGE UP

## 2023-09-25 PROCEDURE — 99232 SBSQ HOSP IP/OBS MODERATE 35: CPT

## 2023-09-25 PROCEDURE — 99233 SBSQ HOSP IP/OBS HIGH 50: CPT

## 2023-09-25 RX ADMIN — MORPHINE SULFATE 2 MILLIGRAM(S): 50 CAPSULE, EXTENDED RELEASE ORAL at 15:09

## 2023-09-25 RX ADMIN — SENNA PLUS 2 TABLET(S): 8.6 TABLET ORAL at 23:15

## 2023-09-25 RX ADMIN — OXYCODONE HYDROCHLORIDE 5 MILLIGRAM(S): 5 TABLET ORAL at 11:40

## 2023-09-25 RX ADMIN — AMPICILLIN SODIUM AND SULBACTAM SODIUM 200 GRAM(S): 250; 125 INJECTION, POWDER, FOR SUSPENSION INTRAMUSCULAR; INTRAVENOUS at 11:32

## 2023-09-25 RX ADMIN — MORPHINE SULFATE 2 MILLIGRAM(S): 50 CAPSULE, EXTENDED RELEASE ORAL at 04:34

## 2023-09-25 RX ADMIN — OXYCODONE HYDROCHLORIDE 5 MILLIGRAM(S): 5 TABLET ORAL at 21:45

## 2023-09-25 RX ADMIN — MORPHINE SULFATE 2 MILLIGRAM(S): 50 CAPSULE, EXTENDED RELEASE ORAL at 08:14

## 2023-09-25 RX ADMIN — MORPHINE SULFATE 2 MILLIGRAM(S): 50 CAPSULE, EXTENDED RELEASE ORAL at 09:00

## 2023-09-25 RX ADMIN — Medication 25 MILLIGRAM(S): at 14:13

## 2023-09-25 RX ADMIN — Medication 25 MILLIGRAM(S): at 23:14

## 2023-09-25 RX ADMIN — LACTULOSE 10 GRAM(S): 10 SOLUTION ORAL at 14:14

## 2023-09-25 RX ADMIN — AMPICILLIN SODIUM AND SULBACTAM SODIUM 200 GRAM(S): 250; 125 INJECTION, POWDER, FOR SUSPENSION INTRAMUSCULAR; INTRAVENOUS at 17:34

## 2023-09-25 RX ADMIN — OXYCODONE HYDROCHLORIDE 5 MILLIGRAM(S): 5 TABLET ORAL at 20:45

## 2023-09-25 RX ADMIN — Medication 25 MILLIGRAM(S): at 05:52

## 2023-09-25 RX ADMIN — MORPHINE SULFATE 2 MILLIGRAM(S): 50 CAPSULE, EXTENDED RELEASE ORAL at 04:03

## 2023-09-25 RX ADMIN — OXYCODONE HYDROCHLORIDE 5 MILLIGRAM(S): 5 TABLET ORAL at 12:16

## 2023-09-25 RX ADMIN — MORPHINE SULFATE 2 MILLIGRAM(S): 50 CAPSULE, EXTENDED RELEASE ORAL at 16:00

## 2023-09-25 RX ADMIN — LACTULOSE 10 GRAM(S): 10 SOLUTION ORAL at 05:52

## 2023-09-25 RX ADMIN — AMPICILLIN SODIUM AND SULBACTAM SODIUM 200 GRAM(S): 250; 125 INJECTION, POWDER, FOR SUSPENSION INTRAMUSCULAR; INTRAVENOUS at 05:52

## 2023-09-25 NOTE — PROGRESS NOTE ADULT - ASSESSMENT
37M with finger infection/abscess   afebrile  leukocytosis   CXR clear   s/p debridement by plastic surgery though likely needs more   blood cultures were sent   abscess cultures sent   tdap has been given     9/21:continue antibiotics, pain control, surgery next week, follow cultures   9/22: no fevers, RA, no wbc, Cr ok, BCs with no growth to date, tissue culture is growing Moderate Staphylococcus aureus, awaiting for sensitivity. Will continue with IV Vancomycin and IV Unasyn for now, Vanco level is 14.8 today. Possibly back to OR next week.   Attending addendum:  I have reviewed all pertinent clinical information and agree with the NP's note.   continue antibiotics   send vancomycin trough with target AUC/HAIR 400-600   (therapeutic drug monitoring required with IV vancomycin)   more debridement next week     9/23: no fevers, RA, no WBC, Cr ok, no new lab work, BCs with no growth to date, wound culture is growing staphylococcus aureus, Vancomycin IV was stopped over the weekend, Unasyn Iv continued, possible further debridement tomorrow.     Plan:  continue Unasyn 3g q6hrs   vancomycin 1250mg q8hrs - was stopped over the weekend  follow abscess cultures - staph aureus  follow blood cultures NGTD  hand elevation   pain control  possibly to OR for further debridement this week     discussed with Dr. David  msg sent to Dr. Mooney

## 2023-09-26 ENCOUNTER — TRANSCRIPTION ENCOUNTER (OUTPATIENT)
Age: 38
End: 2023-09-26

## 2023-09-26 LAB
ALBUMIN SERPL ELPH-MCNC: 2.3 G/DL — LOW (ref 3.3–5)
ALP SERPL-CCNC: 51 U/L — SIGNIFICANT CHANGE UP (ref 40–120)
ALT FLD-CCNC: 22 U/L — SIGNIFICANT CHANGE UP (ref 12–78)
ANION GAP SERPL CALC-SCNC: 3 MMOL/L — LOW (ref 5–17)
AST SERPL-CCNC: 18 U/L — SIGNIFICANT CHANGE UP (ref 15–37)
BILIRUB SERPL-MCNC: 0.2 MG/DL — SIGNIFICANT CHANGE UP (ref 0.2–1.2)
BUN SERPL-MCNC: 14 MG/DL — SIGNIFICANT CHANGE UP (ref 7–23)
CALCIUM SERPL-MCNC: 6.9 MG/DL — LOW (ref 8.5–10.1)
CHLORIDE SERPL-SCNC: 117 MMOL/L — HIGH (ref 96–108)
CO2 SERPL-SCNC: 24 MMOL/L — SIGNIFICANT CHANGE UP (ref 22–31)
CREAT SERPL-MCNC: 0.7 MG/DL — SIGNIFICANT CHANGE UP (ref 0.5–1.3)
EGFR: 123 ML/MIN/1.73M2 — SIGNIFICANT CHANGE UP
GLUCOSE SERPL-MCNC: 70 MG/DL — SIGNIFICANT CHANGE UP (ref 70–99)
HCT VFR BLD CALC: 34.5 % — LOW (ref 39–50)
HGB BLD-MCNC: 11.3 G/DL — LOW (ref 13–17)
MCHC RBC-ENTMCNC: 29.7 PG — SIGNIFICANT CHANGE UP (ref 27–34)
MCHC RBC-ENTMCNC: 32.8 G/DL — SIGNIFICANT CHANGE UP (ref 32–36)
MCV RBC AUTO: 90.6 FL — SIGNIFICANT CHANGE UP (ref 80–100)
NRBC # BLD: 0 /100 WBCS — SIGNIFICANT CHANGE UP (ref 0–0)
PLATELET # BLD AUTO: 176 K/UL — SIGNIFICANT CHANGE UP (ref 150–400)
POTASSIUM SERPL-MCNC: 3.2 MMOL/L — LOW (ref 3.5–5.3)
POTASSIUM SERPL-SCNC: 3.2 MMOL/L — LOW (ref 3.5–5.3)
PROT SERPL-MCNC: 5.2 GM/DL — LOW (ref 6–8.3)
RBC # BLD: 3.81 M/UL — LOW (ref 4.2–5.8)
RBC # FLD: 13.3 % — SIGNIFICANT CHANGE UP (ref 10.3–14.5)
SODIUM SERPL-SCNC: 144 MMOL/L — SIGNIFICANT CHANGE UP (ref 135–145)
WBC # BLD: 3.98 K/UL — SIGNIFICANT CHANGE UP (ref 3.8–10.5)
WBC # FLD AUTO: 3.98 K/UL — SIGNIFICANT CHANGE UP (ref 3.8–10.5)

## 2023-09-26 PROCEDURE — 99232 SBSQ HOSP IP/OBS MODERATE 35: CPT

## 2023-09-26 PROCEDURE — 88304 TISSUE EXAM BY PATHOLOGIST: CPT | Mod: 26

## 2023-09-26 PROCEDURE — 26608 TREAT METACARPAL FRACTURE: CPT | Mod: AS

## 2023-09-26 RX ORDER — POTASSIUM CHLORIDE 20 MEQ
40 PACKET (EA) ORAL ONCE
Refills: 0 | Status: COMPLETED | OUTPATIENT
Start: 2023-09-26 | End: 2023-09-26

## 2023-09-26 RX ORDER — ONDANSETRON 8 MG/1
4 TABLET, FILM COATED ORAL ONCE
Refills: 0 | Status: DISCONTINUED | OUTPATIENT
Start: 2023-09-26 | End: 2023-09-26

## 2023-09-26 RX ORDER — SODIUM CHLORIDE 9 MG/ML
1000 INJECTION, SOLUTION INTRAVENOUS
Refills: 0 | Status: DISCONTINUED | OUTPATIENT
Start: 2023-09-26 | End: 2023-09-26

## 2023-09-26 RX ORDER — MORPHINE SULFATE 50 MG/1
2 CAPSULE, EXTENDED RELEASE ORAL ONCE
Refills: 0 | Status: DISCONTINUED | OUTPATIENT
Start: 2023-09-26 | End: 2023-09-26

## 2023-09-26 RX ORDER — FENTANYL CITRATE 50 UG/ML
25 INJECTION INTRAVENOUS
Refills: 0 | Status: DISCONTINUED | OUTPATIENT
Start: 2023-09-26 | End: 2023-09-26

## 2023-09-26 RX ADMIN — AMPICILLIN SODIUM AND SULBACTAM SODIUM 200 GRAM(S): 250; 125 INJECTION, POWDER, FOR SUSPENSION INTRAMUSCULAR; INTRAVENOUS at 05:38

## 2023-09-26 RX ADMIN — AMPICILLIN SODIUM AND SULBACTAM SODIUM 200 GRAM(S): 250; 125 INJECTION, POWDER, FOR SUSPENSION INTRAMUSCULAR; INTRAVENOUS at 23:35

## 2023-09-26 RX ADMIN — MORPHINE SULFATE 2 MILLIGRAM(S): 50 CAPSULE, EXTENDED RELEASE ORAL at 00:42

## 2023-09-26 RX ADMIN — SODIUM CHLORIDE 75 MILLILITER(S): 9 INJECTION, SOLUTION INTRAVENOUS at 15:09

## 2023-09-26 RX ADMIN — MORPHINE SULFATE 2 MILLIGRAM(S): 50 CAPSULE, EXTENDED RELEASE ORAL at 00:27

## 2023-09-26 RX ADMIN — Medication 25 MILLIGRAM(S): at 21:21

## 2023-09-26 RX ADMIN — MORPHINE SULFATE 2 MILLIGRAM(S): 50 CAPSULE, EXTENDED RELEASE ORAL at 15:48

## 2023-09-26 RX ADMIN — MORPHINE SULFATE 2 MILLIGRAM(S): 50 CAPSULE, EXTENDED RELEASE ORAL at 17:40

## 2023-09-26 RX ADMIN — Medication 40 MILLIEQUIVALENT(S): at 10:14

## 2023-09-26 RX ADMIN — AMPICILLIN SODIUM AND SULBACTAM SODIUM 200 GRAM(S): 250; 125 INJECTION, POWDER, FOR SUSPENSION INTRAMUSCULAR; INTRAVENOUS at 17:40

## 2023-09-26 RX ADMIN — Medication 25 MILLIGRAM(S): at 15:48

## 2023-09-26 RX ADMIN — FENTANYL CITRATE 25 MICROGRAM(S): 50 INJECTION INTRAVENOUS at 15:09

## 2023-09-26 RX ADMIN — MORPHINE SULFATE 2 MILLIGRAM(S): 50 CAPSULE, EXTENDED RELEASE ORAL at 21:20

## 2023-09-26 RX ADMIN — AMPICILLIN SODIUM AND SULBACTAM SODIUM 200 GRAM(S): 250; 125 INJECTION, POWDER, FOR SUSPENSION INTRAMUSCULAR; INTRAVENOUS at 00:16

## 2023-09-26 RX ADMIN — Medication 25 MILLIGRAM(S): at 05:39

## 2023-09-26 RX ADMIN — MORPHINE SULFATE 2 MILLIGRAM(S): 50 CAPSULE, EXTENDED RELEASE ORAL at 18:30

## 2023-09-26 RX ADMIN — LACTULOSE 10 GRAM(S): 10 SOLUTION ORAL at 21:21

## 2023-09-26 RX ADMIN — SENNA PLUS 2 TABLET(S): 8.6 TABLET ORAL at 21:21

## 2023-09-26 RX ADMIN — MORPHINE SULFATE 2 MILLIGRAM(S): 50 CAPSULE, EXTENDED RELEASE ORAL at 16:30

## 2023-09-26 RX ADMIN — MORPHINE SULFATE 2 MILLIGRAM(S): 50 CAPSULE, EXTENDED RELEASE ORAL at 22:20

## 2023-09-26 RX ADMIN — FENTANYL CITRATE 25 MICROGRAM(S): 50 INJECTION INTRAVENOUS at 15:26

## 2023-09-26 NOTE — PROGRESS NOTE ADULT - TIME BILLING
I spent 40 minutes in patient encounter, greater than 50% of the time was spent in counseling/coordination of care.

## 2023-09-26 NOTE — PROGRESS NOTE ADULT - ASSESSMENT
36 y/o male S/P Closed reduction and percutaneous pinning (CRPP) of injury of tight finger 2 and right wrist    - Analgesics for pain  - continue antibiotics per ID  - f/u AM labs  - continue current management per medicine  - will discuss with surgical attending

## 2023-09-26 NOTE — PROGRESS NOTE ADULT - ASSESSMENT
37M with finger infection/abscess   afebrile  leukocytosis   CXR clear   s/p debridement by plastic surgery though likely needs more   blood cultures were sent   abscess cultures sent   tdap has been given     9/21:continue antibiotics, pain control, surgery next week, follow cultures   9/22: no fevers, RA, no wbc, Cr ok, BCs with no growth to date, tissue culture is growing Moderate Staphylococcus aureus, awaiting for sensitivity. Will continue with IV Vancomycin and IV Unasyn for now, Vanco level is 14.8 today. Possibly back to OR next week.   Attending addendum:  I have reviewed all pertinent clinical information and agree with the NP's note.   continue antibiotics   send vancomycin trough with target AUC/HAIR 400-600   (therapeutic drug monitoring required with IV vancomycin)   more debridement next week     9/25: no fevers, RA, no WBC, Cr ok, no new lab work, BCs with no growth to date, wound culture is growing staphylococcus aureus, Vancomycin IV was stopped over the weekend, Unasyn Iv continued, possible further debridement tomorrow.   Attending Addendum--  Case reviewed with NP Lora Carney. Her note reviewed and modified as appropriate.   Cx with MSSA  WBC normal, at times low- monitor.  Current antibiotics adequate  Await debridement    9/26: awaiting for OR - possibly later today, no fevers, RA, no wbc, Cr and LFTs ok, BCs remain with no growth to date, tissue cultures with staph aureus, Unasyn IV continued.     Plan:  continue Unasyn 3g q6hrs   off Vancomycin   follow abscess cultures - staph aureus  follow blood cultures NGTD  hand elevation   pain control  possibly to OR for further debridement today     will discuss with Dr. Lopez

## 2023-09-26 NOTE — BRIEF OPERATIVE NOTE - NSICDXBRIEFPREOP_GEN_ALL_CORE_FT
PRE-OP DIAGNOSIS:  Fracture dislocation of joint 26-Sep-2023 14:09:32 right wrist, right metacarpals Louisa Chacon

## 2023-09-26 NOTE — BRIEF OPERATIVE NOTE - NSICDXBRIEFPROCEDURE_GEN_ALL_CORE_FT
PROCEDURES:  Closed reduction and percutaneous pinning (CRPP) of injury of finger 26-Sep-2023 14:09:14 and wrist, right Louisa Chacon

## 2023-09-27 LAB
ANION GAP SERPL CALC-SCNC: 6 MMOL/L — SIGNIFICANT CHANGE UP (ref 5–17)
BUN SERPL-MCNC: 15 MG/DL — SIGNIFICANT CHANGE UP (ref 7–23)
CALCIUM SERPL-MCNC: 9.1 MG/DL — SIGNIFICANT CHANGE UP (ref 8.5–10.1)
CHLORIDE SERPL-SCNC: 107 MMOL/L — SIGNIFICANT CHANGE UP (ref 96–108)
CO2 SERPL-SCNC: 27 MMOL/L — SIGNIFICANT CHANGE UP (ref 22–31)
CREAT SERPL-MCNC: 1.06 MG/DL — SIGNIFICANT CHANGE UP (ref 0.5–1.3)
EGFR: 94 ML/MIN/1.73M2 — SIGNIFICANT CHANGE UP
GLUCOSE SERPL-MCNC: 110 MG/DL — HIGH (ref 70–99)
POTASSIUM SERPL-MCNC: 4.3 MMOL/L — SIGNIFICANT CHANGE UP (ref 3.5–5.3)
POTASSIUM SERPL-SCNC: 4.3 MMOL/L — SIGNIFICANT CHANGE UP (ref 3.5–5.3)
SODIUM SERPL-SCNC: 140 MMOL/L — SIGNIFICANT CHANGE UP (ref 135–145)

## 2023-09-27 PROCEDURE — 99232 SBSQ HOSP IP/OBS MODERATE 35: CPT

## 2023-09-27 RX ORDER — CEFAZOLIN SODIUM 1 G
2 VIAL (EA) INJECTION
Qty: 240 | Refills: 0
Start: 2023-09-27 | End: 2023-11-05

## 2023-09-27 RX ORDER — CEFAZOLIN SODIUM 1 G
2000 VIAL (EA) INJECTION EVERY 8 HOURS
Refills: 0 | Status: DISCONTINUED | OUTPATIENT
Start: 2023-09-27 | End: 2023-09-28

## 2023-09-27 RX ADMIN — MORPHINE SULFATE 2 MILLIGRAM(S): 50 CAPSULE, EXTENDED RELEASE ORAL at 05:27

## 2023-09-27 RX ADMIN — Medication 25 MILLIGRAM(S): at 22:52

## 2023-09-27 RX ADMIN — MORPHINE SULFATE 2 MILLIGRAM(S): 50 CAPSULE, EXTENDED RELEASE ORAL at 12:00

## 2023-09-27 RX ADMIN — LACTULOSE 10 GRAM(S): 10 SOLUTION ORAL at 13:01

## 2023-09-27 RX ADMIN — MORPHINE SULFATE 2 MILLIGRAM(S): 50 CAPSULE, EXTENDED RELEASE ORAL at 23:13

## 2023-09-27 RX ADMIN — MORPHINE SULFATE 2 MILLIGRAM(S): 50 CAPSULE, EXTENDED RELEASE ORAL at 11:31

## 2023-09-27 RX ADMIN — MORPHINE SULFATE 2 MILLIGRAM(S): 50 CAPSULE, EXTENDED RELEASE ORAL at 22:58

## 2023-09-27 RX ADMIN — LACTULOSE 10 GRAM(S): 10 SOLUTION ORAL at 22:53

## 2023-09-27 RX ADMIN — AMPICILLIN SODIUM AND SULBACTAM SODIUM 200 GRAM(S): 250; 125 INJECTION, POWDER, FOR SUSPENSION INTRAMUSCULAR; INTRAVENOUS at 11:31

## 2023-09-27 RX ADMIN — MORPHINE SULFATE 2 MILLIGRAM(S): 50 CAPSULE, EXTENDED RELEASE ORAL at 16:49

## 2023-09-27 RX ADMIN — Medication 100 MILLIGRAM(S): at 22:55

## 2023-09-27 RX ADMIN — OXYCODONE HYDROCHLORIDE 5 MILLIGRAM(S): 5 TABLET ORAL at 21:11

## 2023-09-27 RX ADMIN — MORPHINE SULFATE 2 MILLIGRAM(S): 50 CAPSULE, EXTENDED RELEASE ORAL at 06:22

## 2023-09-27 RX ADMIN — OXYCODONE HYDROCHLORIDE 5 MILLIGRAM(S): 5 TABLET ORAL at 10:04

## 2023-09-27 RX ADMIN — AMPICILLIN SODIUM AND SULBACTAM SODIUM 200 GRAM(S): 250; 125 INJECTION, POWDER, FOR SUSPENSION INTRAMUSCULAR; INTRAVENOUS at 05:27

## 2023-09-27 RX ADMIN — Medication 25 MILLIGRAM(S): at 05:27

## 2023-09-27 RX ADMIN — Medication 25 MILLIGRAM(S): at 13:00

## 2023-09-27 RX ADMIN — MORPHINE SULFATE 2 MILLIGRAM(S): 50 CAPSULE, EXTENDED RELEASE ORAL at 17:00

## 2023-09-27 RX ADMIN — OXYCODONE HYDROCHLORIDE 5 MILLIGRAM(S): 5 TABLET ORAL at 20:11

## 2023-09-27 RX ADMIN — SENNA PLUS 2 TABLET(S): 8.6 TABLET ORAL at 22:52

## 2023-09-27 RX ADMIN — LACTULOSE 10 GRAM(S): 10 SOLUTION ORAL at 05:27

## 2023-09-27 RX ADMIN — OXYCODONE HYDROCHLORIDE 5 MILLIGRAM(S): 5 TABLET ORAL at 09:34

## 2023-09-27 NOTE — CHART NOTE - NSCHARTNOTEFT_GEN_A_CORE
Patient seen for length of stay nutrition risk rescreening. Patient has been screened for and presents negative for:    -Pressure ulcers stage 2 or greater  -Enteral or Parenteral Nutrition  -BMI <18  -[id number] >8  -Chewing/Swallowing Difficulty  -Decreased appetite  -Unintentional Weight Loss  -Inadequate diet (i.e. NPO/Clear Liquids)    37 y.o male admitted for right finger infection; s/p I & D bedside by plastic surgery.   Also now s/p surgery for multiple fxs of right finter & wrist & tendon repair  Pt eating well; % most meals; no physical signs of malnutrition observed    No nutritional intervention required at this time. RD remains available.

## 2023-09-27 NOTE — PROGRESS NOTE ADULT - PROBLEM SELECTOR PLAN 3
- Start lactulose 10 TID standing today
- Continue with lactulose 10 TID standing

## 2023-09-27 NOTE — PROGRESS NOTE ADULT - PROBLEM SELECTOR PLAN 2
Seen by plastic surgery, s/p I&D at bedside in ED. Noted abscess. Will need further OR for multiple fracture, tendons injury repair  Continue Unasyn, dc vancomycin based on antibiotic sensitivities  ID consulted
Seen by plastic surgery, s/p I&D at bedside in ED. Noted abscess. Will need further OR for multiple fracture, tendons injury repair  Continue vanco and Unasyn  Follow up wound culture result  ID consulted
Seen by plastic surgery, s/p I&D at bedside in ED. Noted abscess. Will need further OR for multiple fracture, tendons injury repair  - Mgmt as above
Seen by plastic surgery, s/p I&D at bedside in ED. Noted abscess. Will need further OR for multiple fracture, tendons injury repair  Continue vanco and Unasyn  Follow up wound culture result  ID consulted
Seen by plastic surgery, s/p I&D at bedside in ED. Noted abscess. Will need further OR for multiple fracture, tendons injury repair  - Mgmt as above

## 2023-09-27 NOTE — PROGRESS NOTE ADULT - PROBLEM SELECTOR PROBLEM 1
Infection of right hand

## 2023-09-27 NOTE — PROGRESS NOTE ADULT - PROBLEM SELECTOR PROBLEM 2
Abscess of finger, right

## 2023-09-27 NOTE — PROGRESS NOTE ADULT - PROBLEM SELECTOR PROBLEM 3
Therapeutic opioid-induced constipation (OIC)

## 2023-09-27 NOTE — PROGRESS NOTE ADULT - PROBLEM SELECTOR PLAN 1
Patient had lacerations of fingers after punching television on 9/14/2023, s/p laceration repair in ED. Patient reported gradual worsening of severe pain of right fingers  Xray on 9/14/23  ( I personally review) with finding of possible 3rd and 4th metacarpals fracture  Seen by plastic surgery, s/p I&D at bedside. Noted abscess. Will need further OR for multiple fracture, tendons injury repair  Continue vanco and Unasyn  Monitor vanco trough, therapeutic monitoring is necessary with vancomycin  CT right hand per plastic surgery  Received Tdap on 9/14/23  Follow up wound culture   Pain control-->IV toradol x 1,  oxy prn for mod, IV morphine for severe, IV Dilaudid for breakthrough pain   ID consulted
Patient had lacerations of fingers after punching television on 9/14/2023, s/p laceration repair in ED. Patient reported gradual worsening of severe pain of right fingers. Received Tdap on 9/14/23.  Xray on 9/14/23  ( I personally review) with finding of possible 3rd and 4th metacarpals fracture  Seen by plastic surgery, s/p I&D at bedside. Noted abscess. Will need further OR for multiple fracture, tendons injury repair  - Continue Unasyn due to culture results   - Appreciate ongoing ID recommendations  - Morphine IV ordered for severe pain    - S/p surgery. F/u ID recs for duration of treatment.
Patient had lacerations of fingers after punching television on 9/14/2023, s/p laceration repair in ED. Patient reported gradual worsening of severe pain of right fingers. Received Tdap on 9/14/23.  Xray on 9/14/23  ( I personally review) with finding of possible 3rd and 4th metacarpals fracture  Seen by plastic surgery, s/p I&D at bedside. Noted abscess. Will need further OR for multiple fracture, tendons injury repair  - Continue Unasyn due to culture results   - Appreciate ongoing plastic recommendations, may need further surgery next week   - Appreciate ongoing ID recommendations  - Morphine IV ordered for severe pain
Patient had lacerations of fingers after punching television on 9/14/2023, s/p laceration repair in ED. Patient reported gradual worsening of severe pain of right fingers. Received Tdap on 9/14/23.  Xray on 9/14/23  ( I personally review) with finding of possible 3rd and 4th metacarpals fracture  Seen by plastic surgery, s/p I&D at bedside. Noted abscess. Will need further OR for multiple fracture, tendons injury repair  - Continue Unasyn due to culture results   - Appreciate ongoing ID recommendations  - Morphine IV ordered for severe pain    - Plan for surgery tomorrow 9/26
Patient had lacerations of fingers after punching television on 9/14/2023, s/p laceration repair in ED. Patient reported gradual worsening of severe pain of right fingers  Xray on 9/14/23  ( I personally review) with finding of possible 3rd and 4th metacarpals fracture  Seen by plastic surgery, s/p I&D at bedside. Noted abscess. Will need further OR for multiple fracture, tendons injury repair  Continue vanco and Unasyn  Monitor vanco trough, therapeutic monitoring is necessary with vancomycin  CT right hand per plastic surgery  Received Tdap on 9/14/23  Follow up wound culture   Pain control-->IV toradol x 1,  oxy prn for mod, IV morphine for severe, IV Dilaudid for breakthrough pain   ID consulted
Patient had lacerations of fingers after punching television on 9/14/2023, s/p laceration repair in ED. Patient reported gradual worsening of severe pain of right fingers. Received Tdap on 9/14/23.  Xray on 9/14/23  ( I personally review) with finding of possible 3rd and 4th metacarpals fracture  Seen by plastic surgery, s/p I&D at bedside. Noted abscess. Will need further OR for multiple fracture, tendons injury repair  - Continue Unasyn, hold vancomycin at this time given antibiotic sensitivities  - Appreciate ongoing plastic recommendations, may need further surgery next week   - Appreciate ongoing ID recommendations  - Morphine IV ordered for severe pain
Patient had lacerations of fingers after punching television on 9/14/2023, s/p laceration repair in ED. Patient reported gradual worsening of severe pain of right fingers. Received Tdap on 9/14/23.  Xray on 9/14/23  ( I personally review) with finding of possible 3rd and 4th metacarpals fracture  Seen by plastic surgery, s/p I&D at bedside. Noted abscess. Will need further OR for multiple fracture, tendons injury repair  - Continue Unasyn due to culture results   - Appreciate ongoing ID recommendations  - Morphine IV ordered for severe pain    - Plan for surgery today

## 2023-09-27 NOTE — PROGRESS NOTE ADULT - ASSESSMENT
37M with finger infection/abscess   afebrile  leukocytosis   CXR clear   s/p debridement by plastic surgery though likely needs more   blood cultures were sent   abscess cultures sent   tdap has been given     9/21:continue antibiotics, pain control, surgery next week, follow cultures   9/22: no fevers, RA, no wbc, Cr ok, BCs with no growth to date, tissue culture is growing Moderate Staphylococcus aureus, awaiting for sensitivity. Will continue with IV Vancomycin and IV Unasyn for now, Vanco level is 14.8 today. Possibly back to OR next week.   Attending addendum:  I have reviewed all pertinent clinical information and agree with the NP's note.   continue antibiotics   send vancomycin trough with target AUC/HAIR 400-600   (therapeutic drug monitoring required with IV vancomycin)   more debridement next week     9/25: no fevers, RA, no WBC, Cr ok, no new lab work, BCs with no growth to date, wound culture is growing staphylococcus aureus, Vancomycin IV was stopped over the weekend, Unasyn Iv continued, possible further debridement tomorrow.   Attending Addendum--  Case reviewed with NP Lora Carney. Her note reviewed and modified as appropriate.   Cx with MSSA  WBC normal, at times low- monitor.  Current antibiotics adequate  Await debridement    9/26: awaiting for OR - possibly later today, no fevers, RA, no wbc, Cr and LFTs ok, BCs remain with no growth to date, tissue cultures with staph aureus, Unasyn IV continued.   Attending addendum:  I have reviewed all pertinent clinical information and agree with the NP's note.   s/p surgery today with plastic surgery  extensive infection involving the joints -will treat for septic arthritis and possible osteomyelitis   needs 6 weeks of antibiotics, IV vs PO pending discussion with patient tomorrow     9/27: s/p closed reduction and perc. pinning of injury of finger and wrist performed yesterday 9/26, afebrile, RA, no new cbc, Cr ok, culture data reviewed, new sx culture is pending, Unasyn IV continued. Most likely pt will need six weeks of abx treatment, po vs IV, pending discussion.     Plan:  continue Unasyn 3g q6hrs   awaiting for surgical culture   follow abscess cultures - staph aureus  follow blood cultures NGTD  hand elevation   pain control  any further surgical procedures per hand surgery     will discuss with Dr. Lopez  37M with finger infection/abscess   afebrile  leukocytosis   CXR clear   s/p debridement by plastic surgery though likely needs more   blood cultures were sent   abscess cultures sent   tdap has been given     9/21:continue antibiotics, pain control, surgery next week, follow cultures   9/22: no fevers, RA, no wbc, Cr ok, BCs with no growth to date, tissue culture is growing Moderate Staphylococcus aureus, awaiting for sensitivity. Will continue with IV Vancomycin and IV Unasyn for now, Vanco level is 14.8 today. Possibly back to OR next week.   Attending addendum:  I have reviewed all pertinent clinical information and agree with the NP's note.   continue antibiotics   send vancomycin trough with target AUC/HAIR 400-600   (therapeutic drug monitoring required with IV vancomycin)   more debridement next week     9/25: no fevers, RA, no WBC, Cr ok, no new lab work, BCs with no growth to date, wound culture is growing staphylococcus aureus, Vancomycin IV was stopped over the weekend, Unasyn Iv continued, possible further debridement tomorrow.   Attending Addendum--  Case reviewed with NP Lora Carney. Her note reviewed and modified as appropriate.   Cx with MSSA  WBC normal, at times low- monitor.  Current antibiotics adequate  Await debridement    9/26: awaiting for OR - possibly later today, no fevers, RA, no wbc, Cr and LFTs ok, BCs remain with no growth to date, tissue cultures with staph aureus, Unasyn IV continued.   Attending addendum:  I have reviewed all pertinent clinical information and agree with the NP's note.   s/p surgery today with plastic surgery  extensive infection involving the joints -will treat for septic arthritis and possible osteomyelitis   needs 6 weeks of antibiotics, IV vs PO pending discussion with patient tomorrow     9/27: s/p closed reduction and perc. pinning of injury of finger and wrist performed yesterday 9/26, afebrile, RA, no new cbc, Cr ok, culture data reviewed, new sx culture is pending, will change to Cefazolin from Unasyn. pt will need six weeks of abx treatment, patient has opted for IV     Plan:  stop Unasyn 3g q6hrs   start Cefazolin 2g q8hrs  follow abscess cultures - staph aureus  blood cultures NGTD  hand elevation   pain control  no further surgical procedures planned per hand surgery

## 2023-09-28 ENCOUNTER — TRANSCRIPTION ENCOUNTER (OUTPATIENT)
Age: 38
End: 2023-09-28

## 2023-09-28 VITALS
OXYGEN SATURATION: 100 % | SYSTOLIC BLOOD PRESSURE: 161 MMHG | HEART RATE: 75 BPM | DIASTOLIC BLOOD PRESSURE: 98 MMHG | TEMPERATURE: 98 F | RESPIRATION RATE: 17 BRPM

## 2023-09-28 PROCEDURE — ZZZZZ: CPT

## 2023-09-28 PROCEDURE — 99232 SBSQ HOSP IP/OBS MODERATE 35: CPT

## 2023-09-28 PROCEDURE — 36573 INSJ PICC RS&I 5 YR+: CPT | Mod: RT

## 2023-09-28 PROCEDURE — 99239 HOSP IP/OBS DSCHRG MGMT >30: CPT

## 2023-09-28 RX ORDER — SODIUM CHLORIDE 9 MG/ML
10 INJECTION INTRAMUSCULAR; INTRAVENOUS; SUBCUTANEOUS
Refills: 0 | Status: DISCONTINUED | OUTPATIENT
Start: 2023-09-28 | End: 2023-09-28

## 2023-09-28 RX ORDER — LACTULOSE 10 G/15ML
15 SOLUTION ORAL
Qty: 0 | Refills: 0 | DISCHARGE
Start: 2023-09-28

## 2023-09-28 RX ORDER — CEFAZOLIN SODIUM 1 G
2 VIAL (EA) INJECTION
Qty: 240 | Refills: 0
Start: 2023-09-28 | End: 2023-11-06

## 2023-09-28 RX ORDER — CHLORHEXIDINE GLUCONATE 213 G/1000ML
1 SOLUTION TOPICAL
Refills: 0 | Status: DISCONTINUED | OUTPATIENT
Start: 2023-09-28 | End: 2023-09-28

## 2023-09-28 RX ORDER — SENNA PLUS 8.6 MG/1
2 TABLET ORAL
Qty: 0 | Refills: 0 | DISCHARGE
Start: 2023-09-28

## 2023-09-28 RX ADMIN — Medication 650 MILLIGRAM(S): at 14:30

## 2023-09-28 RX ADMIN — Medication 25 MILLIGRAM(S): at 13:31

## 2023-09-28 RX ADMIN — LACTULOSE 10 GRAM(S): 10 SOLUTION ORAL at 05:59

## 2023-09-28 RX ADMIN — Medication 100 MILLIGRAM(S): at 05:55

## 2023-09-28 RX ADMIN — CHLORHEXIDINE GLUCONATE 1 APPLICATION(S): 213 SOLUTION TOPICAL at 20:12

## 2023-09-28 RX ADMIN — Medication 25 MILLIGRAM(S): at 05:56

## 2023-09-28 RX ADMIN — Medication 650 MILLIGRAM(S): at 13:42

## 2023-09-28 RX ADMIN — Medication 100 MILLIGRAM(S): at 13:31

## 2023-09-28 RX ADMIN — Medication 100 MILLIGRAM(S): at 20:14

## 2023-09-28 NOTE — DISCHARGE NOTE PROVIDER - HOSPITAL COURSE
37M with no significant medical history present to ED with right hand pain and swelling. Patient had lacerations of fingers after punching television on 9/14/2023, s/p laceration repair. Patient reported gradual worsening of severe pain of right fingers. Denied any fever or chills. Received Tdap on 9/14/23. Seen by plastic surgery, s/p I&D at bedside. Noted abscess. Will need further OR for multiple fracture, tendons injury repair.       Problem/Plan - 1:  ·  Problem: Infection of right hand.   ·  Plan: Patient had lacerations of fingers after punching television on 9/14/2023, s/p laceration repair in ED. Patient reported gradual worsening of severe pain of right fingers. Received Tdap on 9/14/23.  Xray on 9/14/23  ( I personally review) with finding of possible 3rd and 4th metacarpals fracture  Seen by plastic surgery, s/p I&D at bedside. Noted abscess. Will need further OR for multiple fracture, tendons injury repair  -  S/P Closed reduction and percutaneous pinning (CRPP) of injury of tight finger 2 and right wrist 9/26     Problem/Plan - 2:  ·  Problem: Abscess of finger, right.   ·  Plan: Seen by plastic surgery, s/p I&D at bedside in ED. Noted abscess. Will need further OR for multiple fracture, tendons injury repair  - Mgmt as above.     Problem/Plan - 3:  ·  Problem: Therapeutic opioid-induced constipation (OIC).   ·  Plan: - Continue with lactulose 10 TID standing.   37M with no significant medical history present to ED with right hand pain and swelling. Patient had lacerations of fingers after punching television on 9/14/2023, s/p laceration repair. Patient reported gradual worsening of severe pain of right fingers. Denied any fever or chills. Received Tdap on 9/14/23. Seen by plastic surgery, s/p I&D at bedside. Noted abscess. Will need further OR for multiple fracture, tendons injury repair.     Problem/Plan - 1:  ·  Problem: Infection of right hand.   ·  Plan: Patient had lacerations of fingers after punching television on 9/14/2023, s/p laceration repair in ED. Patient reported gradual worsening of severe pain of right fingers. Received Tdap on 9/14/23.  Xray on 9/14/23  ( I personally review) with finding of possible 3rd and 4th metacarpals fracture  Seen by plastic surgery, s/p I&D at bedside. Noted abscess. Will need further OR for multiple fracture, tendons injury repair  -  S/P Closed reduction and percutaneous pinning (CRPP) of injury of tight finger 2 and right wrist 9/26     Problem/Plan - 2:  ·  Problem: Abscess of finger, right.   ·  Plan: Seen by plastic surgery, s/p I&D at bedside in ED. Noted abscess. Will need further OR for multiple fracture, tendons injury repair  - Mgmt as above.     Problem/Plan - 3:  ·  Problem: Therapeutic opioid-induced constipation (OIC).   ·  Plan: - Continue with lactulose 10 TID standing

## 2023-09-28 NOTE — DISCHARGE NOTE PROVIDER - ATTENDING DISCHARGE PHYSICAL EXAMINATION:
Vital Signs Last 24 Hrs  T(F): 98.4 (28 Sep 2023 10:50), Max: 99.8 (28 Sep 2023 05:20)  HR: 82 (28 Sep 2023 10:50) (82 - 98)  BP: 128/76 (28 Sep 2023 10:50) (128/76 - 157/99)  RR: 17 (28 Sep 2023 10:50) (16 - 17)  SpO2: 100% (28 Sep 2023 10:50) (99% - 100%)    Physical Exam:  Constitutional: NAD, awake and alert  Respiratory: cta b/l no wheezing no rhonchi  Cardiovascular: +s1/s2 no edema b/l le  Gastrointestinal: soft nt nd bs+  Neurological: A/O x 3, no focal deficits

## 2023-09-28 NOTE — CONSULT NOTE ADULT - SUBJECTIVE AND OBJECTIVE BOX
Plastics/inpatient consult.  See dictated note. Tolerated initial I&D and attempt at closed reduction of the right hand severe infections and fx dislocations as inpt (pt already admitted and stabilized by ED). Culture sent.  Discussed with admitting hospitalist Dr Anthony.  Rec: IV abx, eg Vanco+Unasyn/splint/elevation/ID consult/observation.  <Will need further operative interventions for multiple fxs/dislocations and tendons and other injuries repair.  Likely needs prolonged abx treatment as per ID.  Prognosis for full function of the right hand: guarded-poor.
Interventional Radiology    Evaluate for Procedure: PICC line placement    HPI: 37M with no significant medical history present to ED with right hand pain and swelling. Patient had lacerations of fingers after punching television on 9/14/2023, s/p laceration repair. Patient reported gradual worsening of severe pain of right fingers. Denied any fever or chills. Received Tdap on 9/14/23. Seen by plastic surgery, s/p I&D at bedside. Noted abscess. IR consulted for PICC line for long term IV antibiotics.     Allergies: No Known Drug Allergies    Medications (Abx/Cardiac/Anticoagulation/Blood Products)    ampicillin/sulbactam  IVPB: 200 mL/Hr IV Intermittent (09-27 @ 11:31)  ceFAZolin   IVPB: 100 mL/Hr IV Intermittent (09-28 @ 05:55)  hydrALAZINE: 25 milliGRAM(s) Oral (09-28 @ 05:56)    Data:    T(C): 36.9  HR: 82  BP: 128/76  RR: 17  SpO2: 100%    -WBC 3.98 / HgB 11.3 / Hct 34.5 / Plt 176  -Na 140 / Cl 107 / BUN 15 / Glucose 110  -K 4.3 / CO2 27 / Cr 1.06  -ALT -- / Alk Phos -- / T.Bili --  -INR 0.88 / PTT 34.7    Radiology: Reviewed    Assessment/Plan:   -37M with no significant medical history present to ED with right hand pain and swelling. Patient had lacerations of fingers after punching television on 9/14/2023, s/p laceration repair. Patient reported gradual worsening of severe pain of right fingers. Denied any fever or chills. Received Tdap on 9/14/23. Seen by plastic surgery, s/p I&D at bedside. Noted abscess. IR consulted for PICC line for long term IV antibiotics.       - case reviewed and approved for PICC line placement today  - IR PICC ordered  - Labs reviewed and appropriate  - not on AC  - does not need to be NPO  - d/w primary team and ID
[facility]  INFECTIOUS DISEASES   [address] [place] [address]  Tel: [phone]     Fax: [phone]  ==============================================================================  MD Hany Huerta, MD Lora Howard, NP   ==============================================================================    TOMMY CHUNG  MRN-[id number]  Male  37y ([birth year])        Patient is a 37y old  Male who presents with a chief complaint of right fingers infection (20 Sep 2023 17:02)      HPI:  37 years old male with no significant medical history present to ED with right hand pain and swelling. Patient had lacerations of fingers after punching television on 9/14/2023, s/p laceration repair. Patient reported gradual worsening of severe pain of right fingers. Denied any fever or chills. Received Tdap on 9/14/23  Hemodynamically stable, afebrile, sat well at RA. No leukocytosis, plt 143, lactate 1, Cr 0.95. Xray on 9/14/23 with finding of possible 3rd and 4th metacarpals fracture. Seen by plastic surgery, s/p I&D at bedside. Noted abscess. Will need further OR for multiple fracture, tendons injury repair.    SH: cannabis use  FH: kidney disease (20 Sep 2023 17:02)    agree with above  ID consulted for workup and antibiotic management     PAST MEDICAL & SURGICAL HISTORY:  Phalanx (hand) fracture  right      No pertinent past medical history      No pertinent past medical history      NO SIGNIFICANT PAST SURGICAL HISTORY      No significant past surgical history          Allergies  shellfish (Anaphylaxis)  shrimp (Angioedema)  No Known Drug Allergies        ANTIMICROBIALS:  ampicillin/sulbactam  IVPB 3 every 6 hours  vancomycin  IVPB 1250 every 8 hours      MEDICATIONS  (STANDING):  ampicillin/sulbactam  IVPB   200 mL/Hr IV Intermittent (09-20-23 @ 16:17)    vancomycin  IVPB.   250 mL/Hr IV Intermittent (09-20-23 @ 15:08)        OTHER MEDS: MEDICATIONS  (STANDING):  acetaminophen     Tablet .. 650 every 6 hours PRN  HYDROmorphone  Injectable 0.5 every 6 hours PRN  melatonin 3 at bedtime PRN  morphine  - Injectable 2 every 4 hours PRN  oxyCODONE    IR 5 every 6 hours PRN  senna 2 at bedtime      SOCIAL HISTORY:     Smoking Cigarettes [ ]Active [ ] Former [x ]Denies   ETOH [ ]denies [ ]Former [x ]Current Use   Drug Use [ ]Never [ ] Former [ x] Active-MJ      FAMILY HISTORY:  noncontributory     REVIEW OF SYSTEMS  [  ] ROS unobtainable because:    [ x ] All other systems negative except as noted below:	    Constitutional:  [ ] fever [ x] chills  [ ] weight loss  [ ] weakness  Skin:  [ ] rash [ ] phlebitis	  Eyes: [ ] icterus [ ] pain  [ ] discharge	  ENMT: [ ] sore throat  [ ] thrush [ ] ulcers [ ] exudates  Respiratory: [ ] dyspnea [ ] hemoptysis [ ] cough [ ] sputum	  Cardiovascular:  [ ] chest pain [ ] palpitations [ ] edema	  Gastrointestinal:  [ ] nausea [ ] vomiting [ ] diarrhea [ ] constipation [ ] pain	  Genitourinary:  [ ] dysuria [ ] frequency [ ] hematuria [ ] discharge [ ] flank pain  [ ] incontinence  Musculoskeletal:  [ ] myalgias [ ] arthralgias [ ] arthritis  [ x] hand pain  Neurological:  [ ] headache [ ] seizures  [ ] confusion/altered mental status  Psychiatric:  [ ] anxiety [ ] depression	  Hematology/Lymphatics:  [ ] lymphadenopathy  Endocrine:  [ ] adrenal [ ] thyroid  Allergic/Immunologic:	 [ ] transplant [ ] seasonal    Vital Signs Last 24 Hrs  T(F): 98.6 (09-20-23 @ 13:40), Max: 98.6 (09-20-23 @ 13:40)    Vital Signs Last 24 Hrs  HR: 81 (09-20-23 @ 13:40) (81 - 81)  BP: 148/100 (09-20-23 @ 13:40) (148/100 - 148/100)  RR: 18 (09-20-23 @ 13:40)  SpO2: 99% (09-20-23 @ 13:40) (99% - 99%)  Wt(kg): --    PHYSICAL EXAM:  Constitutional: non-toxic, no distress but obviously in pain   HEAD/EYES: anicteric, no conjunctival injection  ENT:  supple, no thrush  Cardiovascular:   normal S1, S2, no murmur, no edema  Respiratory:  clear BS bilaterally, no wheezes, no rales  GI:  soft, non-tender, normal bowel sounds  :  no berumen, no CVA tenderness  Musculoskeletal:  no synovitis, normal ROM  Neurologic: awake and alert, normal strength, no focal findings  Skin:  right hand completely wrapped, right arm is warm to touch   Heme/Onc: no lymphadenopathy   Psychiatric:  awake, alert, appropriate mood          WBC Count: 7.10 K/uL (09-20 @ 14:00)      Auto Neutrophil %: 66.5 % (09-20-23 @ 14:00)  Auto Neutrophil #: 4.72 K/uL (09-20-23 @ 14:00)                            13.7   7.10  )-----------( 143      ( 20 Sep 2023 14:00 )             40.7       09-20    141  |  105  |  10  ----------------------------<  91  4.2   |  32<H>  |  0.95    Ca    9.1      20 Sep 2023 14:00    TPro  7.4  /  Alb  3.7  /  TBili  0.5  /  DBili  x   /  AST  18  /  ALT  17  /  AlkPhos  80  09-20      Creatinine Trend: 0.95<--      Lactate, Blood: 1.0 mmol/L (09-20-23 @ 14:00)      MICROBIOLOGY:    RADIOLOGY:  < from: Xray Chest 1 View-PORTABLE IMMEDIATE (09.20.23 @ 17:05) >  IMPRESSION: Clear lungs with no acute cardiopulmonary abnormalities.      I have personally reviewed the above imaging

## 2023-09-28 NOTE — PROCEDURE NOTE - ADDITIONAL PROCEDURE DETAILS
Successful insertion of 4F 43cm single lumen PICC line via right basilic vein, using ultrasound and fluoroscopic guidance. Tip in SVC. Okay to use. No complications. Full report to follow.

## 2023-09-28 NOTE — PROGRESS NOTE ADULT - SUBJECTIVE AND OBJECTIVE BOX
Patient is a 37y old  Male who presents with a chief complaint of right fingers infection (26 Sep 2023 12:40)    INTERVAL HPI/OVERNIGHT EVENTS: No acute events overnight. HD stable.     MEDICATIONS  (STANDING):  ampicillin/sulbactam  IVPB 3 Gram(s) IV Intermittent every 6 hours  hydrALAZINE 25 milliGRAM(s) Oral every 8 hours  lactulose Syrup 10 Gram(s) Oral three times a day  senna 2 Tablet(s) Oral at bedtime    MEDICATIONS  (PRN):  acetaminophen     Tablet .. 650 milliGRAM(s) Oral every 6 hours PRN Mild Pain (1 - 3)  melatonin 3 milliGRAM(s) Oral at bedtime PRN Insomnia  morphine  - Injectable 2 milliGRAM(s) IV Push every 4 hours PRN Severe Pain (7 - 10)  oxyCODONE    IR 5 milliGRAM(s) Oral every 6 hours PRN Moderate Pain (4 - 6)      Allergies    shellfish (Anaphylaxis)  shrimp (Angioedema)  No Known Drug Allergies    Intolerances        REVIEW OF SYSTEMS: all negative with exception of above    Vital Signs Last 24 Hrs  T(C): 36.6 (26 Sep 2023 10:27), Max: 36.9 (26 Sep 2023 06:12)  T(F): 97.8 (26 Sep 2023 10:27), Max: 98.4 (26 Sep 2023 06:12)  HR: 69 (26 Sep 2023 10:27) (69 - 87)  BP: 157/107 (26 Sep 2023 10:27) (144/91 - 158/92)  BP(mean): --  RR: 18 (26 Sep 2023 10:27) (16 - 18)  SpO2: 99% (26 Sep 2023 10:27) (99% - 99%)    Parameters below as of 26 Sep 2023 10:27  Patient On (Oxygen Delivery Method): room air        PHYSICAL EXAM:  GENERAL: NAD, well-groomed  NERVOUS SYSTEM:  Alert & Oriented X3, Good concentration; Motor Strength 5/5 B/L upper and lower extremities; DTRs 2+ intact and symmetric  CHEST/LUNG: Clear to percussion bilaterally; No rales, rhonchi, wheezing, or rubs  HEART: Regular rate and rhythm; No murmurs, rubs, or gallops  ABDOMEN: Soft, Nontender, Nondistended; Bowel sounds present  EXTREMITIES:  2+ Peripheral Pulses, No clubbing, cyanosis, or edema      LABS:                        11.3   3.98  )-----------( 176      ( 26 Sep 2023 06:30 )             34.5     09-26    144  |  117<H>  |  14  ----------------------------<  70  3.2<L>   |  24  |  0.70    Ca    6.9<L>      26 Sep 2023 06:30    TPro  5.2<L>  /  Alb  2.3<L>  /  TBili  0.2  /  DBili  x   /  AST  18  /  ALT  22  /  AlkPhos  51  09-26      Urinalysis Basic - ( 26 Sep 2023 06:30 )    Color: x / Appearance: x / SG: x / pH: x  Gluc: 70 mg/dL / Ketone: x  / Bili: x / Urobili: x   Blood: x / Protein: x / Nitrite: x   Leuk Esterase: x / RBC: x / WBC x   Sq Epi: x / Non Sq Epi: x / Bacteria: x      CAPILLARY BLOOD GLUCOSE          RADIOLOGY & ADDITIONAL TESTS:    Imaging Personally Reviewed:  [ ] YES  [ ] NO    Consultant(s) Notes Reviewed:  [ ] YES  [ ] NO    Care Discussed with Consultants/Other Providers [ ] YES  [ ] NO
TOMMY CHUNG  MRN-[id number]    Follow Up:  finger infection     Interval History: The pt was seen and examined earlier, no acute distress, no fevers, RA, no wbc, pain is controlled.     PAST MEDICAL & SURGICAL HISTORY:  Phalanx (hand) fracture  right      No pertinent past medical history      No pertinent past medical history      NO SIGNIFICANT PAST SURGICAL HISTORY      No significant past surgical history          ROS:    [ ] Unobtainable because:  [x ] All other systems negative    Constitutional: no fever, no chills  Head: no trauma  Eyes: no vision changes, no eye pain  ENT:  no sore throat, no rhinorrhea  Cardiovascular:  no chest pain, no palpitation  Respiratory:  no SOB, no cough  GI:  no abd pain, no vomiting, no diarrhea  urinary: no dysuria, no hematuria, no flank pain  musculoskeletal: post op pain is controlled   skin:  no rash  neurology:  no headache, no seizure, no change in mental status  psych: no anxiety, no depression         Allergies  shellfish (Anaphylaxis)  shrimp (Angioedema)  No Known Drug Allergies        ANTIMICROBIALS:  ampicillin/sulbactam  IVPB 3 every 6 hours  vancomycin  IVPB 1250 every 8 hours      OTHER MEDS:  acetaminophen     Tablet .. 650 milliGRAM(s) Oral every 6 hours PRN  hydrALAZINE 25 milliGRAM(s) Oral every 8 hours  melatonin 3 milliGRAM(s) Oral at bedtime PRN  morphine  - Injectable 2 milliGRAM(s) IV Push every 4 hours PRN  oxyCODONE    IR 5 milliGRAM(s) Oral every 6 hours PRN  senna 2 Tablet(s) Oral at bedtime      Vital Signs Last 24 Hrs  T(C): 36.8 (22 Sep 2023 11:22), Max: 37.5 (21 Sep 2023 20:00)  T(F): 98.2 (22 Sep 2023 11:22), Max: 99.5 (21 Sep 2023 20:00)  HR: 86 (22 Sep 2023 13:54) (71 - 89)  BP: 142/90 (22 Sep 2023 13:54) (142/90 - 166/95)  BP(mean): --  RR: 16 (22 Sep 2023 11:22) (16 - 20)  SpO2: 99% (22 Sep 2023 11:22) (98% - 99%)    Parameters below as of 21 Sep 2023 20:00  Patient On (Oxygen Delivery Method): room air        Physical Exam:  General:    NAD,  non toxic  Head: atraumatic, normocephalic  Eye: normal sclera and conjunctiva  ENT:    no oral lesions, neck supple  Cardio:     regular S1, S2,  no murmur  Respiratory:    clear b/l,    no wheezing  abd:     soft,   BS +,   no tenderness  :   no CVAT,  no suprapubic tenderness,   no  berumen  Musculoskeletal:   no joint swelling,   no edema  vascular: no central lines, +PIV   Skin:  hand remains heavily wrapped, less tender and warm today   Neurologic:     no focal deficit  psych: normal affect    WBC Count: 5.85 K/uL (09-22 @ 06:10)  WBC Count: 5.53 K/uL (09-21 @ 06:22)  WBC Count: 7.10 K/uL (09-20 @ 14:00)                            13.4   5.85  )-----------( 132      ( 22 Sep 2023 06:10 )             39.4       09-22    137  |  101  |  7   ----------------------------<  84  3.8   |  31  |  0.94    Ca    8.9      22 Sep 2023 06:10  Phos  2.6     09-21  Mg     1.6     09-21    TPro  6.5  /  Alb  2.9<L>  /  TBili  0.5  /  DBili  x   /  AST  14<L>  /  ALT  16  /  AlkPhos  66  09-22      Urinalysis Basic - ( 22 Sep 2023 06:10 )    Color: x / Appearance: x / SG: x / pH: x  Gluc: 84 mg/dL / Ketone: x  / Bili: x / Urobili: x   Blood: x / Protein: x / Nitrite: x   Leuk Esterase: x / RBC: x / WBC x   Sq Epi: x / Non Sq Epi: x / Bacteria: x        Creatinine Trend: 0.94<--, 0.86<--, 0.95<--      MICROBIOLOGY:  Vancomycin Level, Trough: 14.8 ug/mL (09-22-23 @ 14:05)  v  Clean Catch Clean Catch (Midstream)  09-20-23   No growth  --  --      .Tissue Other  09-20-23   Moderate Staphylococcus aureus  --    Few polymorphonuclear leukocytes per low power field  No organisms seen per low power field      .Blood Blood-Peripheral  09-20-23   No growth at 24 hours  --  --      .Blood Blood-Peripheral  09-20-23   No growth at 24 hours  --  --      C-Reactive Protein, Serum: 91 (09-20)    RADIOLOGY:    
Patient is a 37y old  Male who presents with a chief complaint of right fingers infection (24 Sep 2023 10:38)    INTERVAL HPI/OVERNIGHT EVENTS: No acute events overnight. HD stable.     MEDICATIONS  (STANDING):  ampicillin/sulbactam  IVPB 3 Gram(s) IV Intermittent every 6 hours  hydrALAZINE 25 milliGRAM(s) Oral every 8 hours  lactulose Syrup 10 Gram(s) Oral three times a day  senna 2 Tablet(s) Oral at bedtime    MEDICATIONS  (PRN):  acetaminophen     Tablet .. 650 milliGRAM(s) Oral every 6 hours PRN Mild Pain (1 - 3)  melatonin 3 milliGRAM(s) Oral at bedtime PRN Insomnia  morphine  - Injectable 2 milliGRAM(s) IV Push every 4 hours PRN Severe Pain (7 - 10)  oxyCODONE    IR 5 milliGRAM(s) Oral every 6 hours PRN Moderate Pain (4 - 6)      Allergies    shellfish (Anaphylaxis)  shrimp (Angioedema)  No Known Drug Allergies    Intolerances        REVIEW OF SYSTEMS: all negative with exception of above    Vital Signs Last 24 Hrs  T(C): 36.8 (25 Sep 2023 10:58), Max: 37.2 (24 Sep 2023 17:06)  T(F): 98.2 (25 Sep 2023 10:58), Max: 98.9 (24 Sep 2023 17:06)  HR: 83 (25 Sep 2023 10:58) (71 - 83)  BP: 167/84 (25 Sep 2023 10:58) (151/91 - 167/84)  BP(mean): --  RR: 17 (25 Sep 2023 10:58) (17 - 18)  SpO2: 99% (25 Sep 2023 10:58) (97% - 99%)    Parameters below as of 25 Sep 2023 05:49  Patient On (Oxygen Delivery Method): room air        PHYSICAL EXAM:  GENERAL: NAD, well-groomed  NERVOUS SYSTEM:  Alert & Oriented X3, Good concentration; Motor Strength 5/5 B/L upper and lower extremities; DTRs 2+ intact and symmetric  CHEST/LUNG: Clear to percussion bilaterally; No rales, rhonchi, wheezing, or rubs  HEART: Regular rate and rhythm; No murmurs, rubs, or gallops  ABDOMEN: Soft, Nontender, Nondistended; Bowel sounds present  EXTREMITIES:  2+ Peripheral Pulses, No clubbing, cyanosis, or edema    LABS:                        12.1   4.24  )-----------( 161      ( 24 Sep 2023 06:50 )             36.7     09-24    138  |  104  |  10  ----------------------------<  82  3.8   |  29  |  0.91    Ca    8.8      24 Sep 2023 06:50  Phos  3.8     09-24  Mg     2.2     09-24        Urinalysis Basic - ( 24 Sep 2023 06:50 )    Color: x / Appearance: x / SG: x / pH: x  Gluc: 82 mg/dL / Ketone: x  / Bili: x / Urobili: x   Blood: x / Protein: x / Nitrite: x   Leuk Esterase: x / RBC: x / WBC x   Sq Epi: x / Non Sq Epi: x / Bacteria: x      CAPILLARY BLOOD GLUCOSE          RADIOLOGY & ADDITIONAL TESTS:    Imaging Personally Reviewed:  [ ] YES  [ ] NO    Consultant(s) Notes Reviewed:  [ ] YES  [ ] NO    Care Discussed with Consultants/Other Providers [ ] YES  [ ] NO
TOMMY CHUNG  MRN-[id number]    Follow Up:  finger infection     Interval History: the pt was seen and examined earlier, no acute distress, no new complaints. Pt is afebrile, RA, no wbc.     PAST MEDICAL & SURGICAL HISTORY:  Phalanx (hand) fracture  right      No pertinent past medical history      No pertinent past medical history      NO SIGNIFICANT PAST SURGICAL HISTORY      No significant past surgical history          ROS:    [ ] Unobtainable because:  [x ] All other systems negative    Constitutional: no fever, no chills  Head: no trauma  Eyes: no vision changes, no eye pain  ENT:  no sore throat, no rhinorrhea  Cardiovascular:  no chest pain, no palpitation  Respiratory:  no SOB, no cough  GI:  no abd pain, no vomiting, no diarrhea  urinary: no dysuria, no hematuria, no flank pain  musculoskeletal:  post op pain is controlled   skin:  no rash  neurology:  no headache, no seizure, no change in mental status  psych: no anxiety, no depression         Allergies  shellfish (Anaphylaxis)  shrimp (Angioedema)  No Known Drug Allergies        ANTIMICROBIALS:  ampicillin/sulbactam  IVPB 3 every 6 hours      OTHER MEDS:  acetaminophen     Tablet .. 650 milliGRAM(s) Oral every 6 hours PRN  hydrALAZINE 25 milliGRAM(s) Oral every 8 hours  lactulose Syrup 10 Gram(s) Oral three times a day  melatonin 3 milliGRAM(s) Oral at bedtime PRN  morphine  - Injectable 2 milliGRAM(s) IV Push every 4 hours PRN  oxyCODONE    IR 5 milliGRAM(s) Oral every 6 hours PRN  senna 2 Tablet(s) Oral at bedtime      Vital Signs Last 24 Hrs  T(C): 36.8 (25 Sep 2023 10:58), Max: 37.2 (24 Sep 2023 17:06)  T(F): 98.2 (25 Sep 2023 10:58), Max: 98.9 (24 Sep 2023 17:06)  HR: 83 (25 Sep 2023 10:58) (71 - 83)  BP: 167/84 (25 Sep 2023 10:58) (151/91 - 167/84)  BP(mean): --  RR: 17 (25 Sep 2023 10:58) (17 - 18)  SpO2: 99% (25 Sep 2023 10:58) (97% - 99%)    Parameters below as of 25 Sep 2023 05:49  Patient On (Oxygen Delivery Method): room air        Physical Exam:  General:    NAD,  non toxic  Head: atraumatic, normocephalic  Eye: normal sclera and conjunctiva  ENT:    no oral lesions, neck supple  Cardio:     regular S1, S2,  no murmur  Respiratory:    clear b/l,    no wheezing  abd:     soft,   BS +,   no tenderness  :   no CVAT,  no suprapubic tenderness,   no  berumen  Musculoskeletal:   no joint swelling,   no edema  vascular: no central lines, +PIV   Skin:  hand remains heavily wrapped, less tender and warm today, sensation at finger tips is present   Neurologic:     no focal deficit  psych: normal affect    WBC Count: 4.24 K/uL (09-24 @ 06:50)  WBC Count: 5.85 K/uL (09-22 @ 06:10)  WBC Count: 5.53 K/uL (09-21 @ 06:22)  WBC Count: 7.10 K/uL (09-20 @ 14:00)                            12.1   4.24  )-----------( 161      ( 24 Sep 2023 06:50 )             36.7       09-24    138  |  104  |  10  ----------------------------<  82  3.8   |  29  |  0.91    Ca    8.8      24 Sep 2023 06:50  Phos  3.8     09-24  Mg     2.2     09-24        Urinalysis Basic - ( 24 Sep 2023 06:50 )    Color: x / Appearance: x / SG: x / pH: x  Gluc: 82 mg/dL / Ketone: x  / Bili: x / Urobili: x   Blood: x / Protein: x / Nitrite: x   Leuk Esterase: x / RBC: x / WBC x   Sq Epi: x / Non Sq Epi: x / Bacteria: x        Creatinine Trend: 0.91<--, 0.94<--, 0.86<--, 0.95<--      MICROBIOLOGY:  v  Clean Catch Clean Catch (Midstream)  09-20-23   No growth  --  --      .Tissue Other  09-20-23   Moderate Staphylococcus aureus  --  Staphylococcus aureus      .Blood Blood-Peripheral  09-20-23   No growth at 4 days  --  --      .Blood Blood-Peripheral  09-20-23   No growth at 4 days  --  --    C-Reactive Protein, Serum: 91 (09-20)    RADIOLOGY:    
TOMMY CHUNG  MRN-[id number]    Follow Up:  finger infection     Interval History: the pt was seen and examined, no acute distress, no new complaints, awaiting for OR scheduled later today. Pt is afebrile, RA, no wbc.     PAST MEDICAL & SURGICAL HISTORY:  Phalanx (hand) fracture  right      No pertinent past medical history      No pertinent past medical history      NO SIGNIFICANT PAST SURGICAL HISTORY      No significant past surgical history          ROS:    [ ] Unobtainable because:  [x ] All other systems negative    Constitutional: no fever, no chills  Head: no trauma  Eyes: no vision changes, no eye pain  ENT:  no sore throat, no rhinorrhea  Cardiovascular:  no chest pain, no palpitation  Respiratory:  no SOB, no cough  GI:  no abd pain, no vomiting, no diarrhea  urinary: no dysuria, no hematuria, no flank pain  musculoskeletal:  pain is controlled   skin:  no rash  neurology:  no headache, no seizure, no change in mental status  psych: no anxiety, no depression         Allergies  shellfish (Anaphylaxis)  shrimp (Angioedema)  No Known Drug Allergies        ANTIMICROBIALS:  ampicillin/sulbactam  IVPB 3 every 6 hours      OTHER MEDS:  acetaminophen     Tablet .. 650 milliGRAM(s) Oral every 6 hours PRN  hydrALAZINE 25 milliGRAM(s) Oral every 8 hours  lactulose Syrup 10 Gram(s) Oral three times a day  melatonin 3 milliGRAM(s) Oral at bedtime PRN  morphine  - Injectable 2 milliGRAM(s) IV Push every 4 hours PRN  oxyCODONE    IR 5 milliGRAM(s) Oral every 6 hours PRN  senna 2 Tablet(s) Oral at bedtime      Vital Signs Last 24 Hrs  T(C): 36.6 (26 Sep 2023 10:27), Max: 36.9 (26 Sep 2023 06:12)  T(F): 97.8 (26 Sep 2023 10:27), Max: 98.4 (26 Sep 2023 06:12)  HR: 69 (26 Sep 2023 10:27) (69 - 87)  BP: 157/107 (26 Sep 2023 10:27) (144/91 - 158/92)  BP(mean): --  RR: 18 (26 Sep 2023 10:27) (16 - 18)  SpO2: 99% (26 Sep 2023 10:27) (99% - 99%)    Parameters below as of 26 Sep 2023 10:27  Patient On (Oxygen Delivery Method): room air        Physical Exam:  General:    NAD,  non toxic  Head: atraumatic, normocephalic  Eye: normal sclera and conjunctiva  ENT:    no oral lesions, neck supple  Cardio:     regular S1, S2,  no murmur  Respiratory:    clear b/l,    no wheezing  abd:     soft,   BS +,   no tenderness  :   no CVAT,  no suprapubic tenderness,   no  berumen  Musculoskeletal:   no joint swelling,   no edema  vascular: no central lines, +PIV   Skin:  hand remains heavily wrapped, less tender and warm today, sensation at finger tips is present   Neurologic:     no focal deficit  psych: normal affect      WBC Count: 3.98 K/uL (09-26 @ 06:30)  WBC Count: 4.24 K/uL (09-24 @ 06:50)  WBC Count: 5.85 K/uL (09-22 @ 06:10)  WBC Count: 5.53 K/uL (09-21 @ 06:22)  WBC Count: 7.10 K/uL (09-20 @ 14:00)                            11.3   3.98  )-----------( 176      ( 26 Sep 2023 06:30 )             34.5       09-26    144  |  117<H>  |  14  ----------------------------<  70  3.2<L>   |  24  |  0.70    Ca    6.9<L>      26 Sep 2023 06:30    TPro  5.2<L>  /  Alb  2.3<L>  /  TBili  0.2  /  DBili  x   /  AST  18  /  ALT  22  /  AlkPhos  51  09-26      Urinalysis Basic - ( 26 Sep 2023 06:30 )    Color: x / Appearance: x / SG: x / pH: x  Gluc: 70 mg/dL / Ketone: x  / Bili: x / Urobili: x   Blood: x / Protein: x / Nitrite: x   Leuk Esterase: x / RBC: x / WBC x   Sq Epi: x / Non Sq Epi: x / Bacteria: x        Creatinine Trend: 0.70<--, 0.91<--, 0.94<--, 0.86<--, 0.95<--      MICROBIOLOGY:  v  Clean Catch Clean Catch (Midstream)  09-20-23   No growth  --  --      .Tissue Other  09-20-23   Moderate Staphylococcus aureus  --  Staphylococcus aureus      .Blood Blood-Peripheral  09-20-23   No growth at 5 days  --  --      .Blood Blood-Peripheral  09-20-23   No growth at 5 days  --  --        C-Reactive Protein, Serum: 91 (09-20)      RADIOLOGY:    
[facility]  INFECTIOUS DISEASES   [address] [place] [address]  Tel: [phone]     Fax: [phone]  ==============================================================================  MD Hany Huerta, MD Lora Howard, NP   ==============================================================================      TOMMY CHUNG  MRN-[id number]  37y ([birth year])      Interval History: patient seen and examined. a lot more comfortable today, on pain control regimen, surgery again next week, continue IV antibiotics     ROS:    [ ] Unobtainable because:  [ x] All other systems negative except as noted above        Allergies  shellfish (Anaphylaxis)  shrimp (Angioedema)  No Known Drug Allergies        ANTIMICROBIALS:  ampicillin/sulbactam  IVPB 3 every 6 hours  vancomycin  IVPB 1250 every 8 hours      OTHER MEDS:  acetaminophen     Tablet .. 650 milliGRAM(s) Oral every 6 hours PRN  hydrALAZINE 25 milliGRAM(s) Oral every 8 hours  HYDROmorphone  Injectable 0.5 milliGRAM(s) IV Push every 6 hours PRN  melatonin 3 milliGRAM(s) Oral at bedtime PRN  morphine  - Injectable 2 milliGRAM(s) IV Push every 4 hours PRN  oxyCODONE    IR 5 milliGRAM(s) Oral every 6 hours PRN  senna 2 Tablet(s) Oral at bedtime      Physical Exam:  Vital Signs Last 24 Hrs  T(C): 37.5 (21 Sep 2023 20:00), Max: 37.5 (21 Sep 2023 20:00)  T(F): 99.5 (21 Sep 2023 20:00), Max: 99.5 (21 Sep 2023 20:00)  HR: 87 (21 Sep 2023 20:00) (63 - 87)  BP: 155/92 (21 Sep 2023 20:00) (146/77 - 171/96)  BP(mean): --  RR: 16 (21 Sep 2023 20:00) (16 - 20)  SpO2: 98% (21 Sep 2023 20:00) (98% - 100%)    Parameters below as of 21 Sep 2023 20:00  Patient On (Oxygen Delivery Method): room air        General:    NAD,  non toxic  Head: atraumatic, normocephalic  Eye: normal sclera and conjunctiva  ENT:    no oral lesions, neck supple  Cardio:     regular S1, S2,  no murmur  Respiratory:    clear b/l,    no wheezing  abd:     soft,   BS +,   no tenderness  :   no CVAT,  no suprapubic tenderness,   no  berumen  Musculoskeletal:   no joint swelling,   no edema  vascular: no central lines, +PIV   Skin:    hand remains heavily wrapped, less tender and warm today   Neurologic:     no focal deficit  psych: normal affect    WBC Count: 5.53 K/uL (09-21 @ 06:22)  WBC Count: 7.10 K/uL (09-20 @ 14:00)                            12.1   5.53  )-----------( 120      ( 21 Sep 2023 06:22 )             35.7       09-21    139  |  105  |  8   ----------------------------<  101<H>  3.6   |  30  |  0.86    Ca    8.5      21 Sep 2023 06:22  Phos  2.6     09-21  Mg     1.6     09-21    TPro  6.2  /  Alb  2.9<L>  /  TBili  0.6  /  DBili  x   /  AST  13<L>  /  ALT  14  /  AlkPhos  62  09-21      Urinalysis Basic - ( 21 Sep 2023 06:22 )    Color: x / Appearance: x / SG: x / pH: x  Gluc: 101 mg/dL / Ketone: x  / Bili: x / Urobili: x   Blood: x / Protein: x / Nitrite: x   Leuk Esterase: x / RBC: x / WBC x   Sq Epi: x / Non Sq Epi: x / Bacteria: x          Creatinine Trend: 0.86<--, 0.95<--  Lactate, Blood: 1.0 mmol/L (09-20-23 @ 14:00)      MICROBIOLOGY:  Vancomycin Level, Trough: 9.1 ug/mL (09-21-23 @ 14:26)  v  .Tissue Other  09-20-23   Moderate Staphylococcus aureus  --    Few polymorphonuclear leukocytes per low power field  No organisms seen per low power field      .Blood Blood-Peripheral  09-20-23   No growth at 24 hours  --  --      .Blood Blood-Peripheral  09-20-23   No growth at 24 hours  --  --    C-Reactive Protein, Serum: 91 (09-20)    RADIOLOGY:  < from: CT Hand No Cont, Right (09.20.23 @ 17:52) >    IMPRESSION:  Multiple acute fracture dislocations about the carpal/metacarpal bones as   described.    < end of copied text >    
S/P Closed reduction and percutaneous pinning (CRPP) of injury of tight finger 2 and right wrist,  37y year old Male seen and examined at bedside. Admits to right hand pain, well controlled with medication. Denies chest pain, shortness of breath, nausea/vomiting.    Vital Signs Last 24 Hrs  T(F): 98.5 (09-27-23 @ 02:45), Max: 98.5 (09-26-23 @ 22:45)  HR: 89 (09-27-23 @ 02:45)  BP: 129/88 (09-27-23 @ 02:45)  RR: 17 (09-27-23 @ 02:45)  SpO2: 99% (09-27-23 @ 02:45)  Wt(kg): --   CAPILLARY BLOOD GLUCOSE          GENERAL: Alert, NAD  CHEST/LUNG: Clear to auscultation bilaterally, respirations nonlabored  HEART: +S1S2, regular rate and rhythm  ABDOMEN: soft, nondistended. Appropriate incisional tenderness. Dressings clean dry intact x 4 over trocar sites  EXTREMITIES:  no calf tenderness, no edema. Intermittent pneumatic compression devices b/l LE. Right hand with ACE wrap CDI. + sensation to fingers         
TOMMY CHUNG  MRN-[id number]    Follow Up:  finger infection, possible OM    Interval History: the pt was seen and examined earlier, no acute distress, feeling better, eager to go home, no new complaints.     PAST MEDICAL & SURGICAL HISTORY:  Phalanx (hand) fracture  right      No pertinent past medical history      No pertinent past medical history      NO SIGNIFICANT PAST SURGICAL HISTORY      No significant past surgical history          ROS:    [ ] Unobtainable because:  [x ] All other systems negative    Constitutional: no fever, no chills  Head: no trauma  Eyes: no vision changes, no eye pain  ENT:  no sore throat, no rhinorrhea  Cardiovascular:  no chest pain, no palpitation  Respiratory:  no SOB, no cough  GI:  no abd pain, no vomiting, no diarrhea  urinary: no dysuria, no hematuria, no flank pain  musculoskeletal:  post op pain is controlled   skin:  no rash  neurology:  no headache, no seizure, no change in mental status  psych: no anxiety, no depression         Allergies  shellfish (Anaphylaxis)  shrimp (Angioedema)  No Known Drug Allergies        ANTIMICROBIALS:  ceFAZolin   IVPB 2000 every 8 hours      OTHER MEDS:  acetaminophen     Tablet .. 650 milliGRAM(s) Oral every 6 hours PRN  hydrALAZINE 25 milliGRAM(s) Oral every 8 hours  lactulose Syrup 10 Gram(s) Oral three times a day  melatonin 3 milliGRAM(s) Oral at bedtime PRN  senna 2 Tablet(s) Oral at bedtime      Vital Signs Last 24 Hrs  T(C): 36.9 (28 Sep 2023 10:50), Max: 37.7 (28 Sep 2023 05:20)  T(F): 98.4 (28 Sep 2023 10:50), Max: 99.8 (28 Sep 2023 05:20)  HR: 82 (28 Sep 2023 10:50) (82 - 98)  BP: 128/76 (28 Sep 2023 10:50) (128/76 - 157/99)  BP(mean): --  RR: 17 (28 Sep 2023 10:50) (16 - 17)  SpO2: 100% (28 Sep 2023 10:50) (99% - 100%)    Parameters below as of 28 Sep 2023 10:50  Patient On (Oxygen Delivery Method): room air        Physical Exam:  General: NAD,  non toxic  Head: atraumatic, normocephalic  Eye: normal sclera and conjunctiva  ENT:  no oral lesions, neck supple  Cardio:  regular S1, S2,  no murmur  Respiratory:    clear b/l,    no wheezing  abd:     soft,   BS +,   no tenderness  :   no CVAT,  no suprapubic tenderness,   no  berumen  Musculoskeletal:   no joint swelling,   no edema  vascular: no central lines, +PIV   Skin:  hand remains heavily wrapped c/d/i  Neurologic:     no focal deficit  psych: normal affect    WBC Count: 3.98 K/uL (09-26 @ 06:30)  WBC Count: 4.24 K/uL (09-24 @ 06:50)  WBC Count: 5.85 K/uL (09-22 @ 06:10)          09-27    140  |  107  |  15  ----------------------------<  110<H>  4.3   |  27  |  1.06    Ca    9.1      27 Sep 2023 06:35        Urinalysis Basic - ( 27 Sep 2023 06:35 )    Color: x / Appearance: x / SG: x / pH: x  Gluc: 110 mg/dL / Ketone: x  / Bili: x / Urobili: x   Blood: x / Protein: x / Nitrite: x   Leuk Esterase: x / RBC: x / WBC x   Sq Epi: x / Non Sq Epi: x / Bacteria: x        Creatinine Trend: 1.06<--, 0.70<--, 0.91<--, 0.94<--, 0.86<--, 0.95<--      MICROBIOLOGY:  v  .Surgical Swab RIGHT HAND POST IRRIGATION CULTURE  09-26-23   No growth  --  --      Clean Catch Clean Catch (Midstream)  09-20-23   No growth  --  --      .Tissue Other  09-20-23   Moderate Staphylococcus aureus  --  Staphylococcus aureus      .Blood Blood-Peripheral  09-20-23   No growth at 5 days  --  --      .Blood Blood-Peripheral  09-20-23   No growth at 5 days  --  --      C-Reactive Protein, Serum: 91 (09-20)    RADIOLOGY:    
TOMMY CHUNG  MRN-[id number]    Follow Up:  finger infection     Interval History: the pt was seen and examined earlier, no acute distress, no new complaints, s/p surgery yesterday (closed reduction and perc pinning of injury of finger and wrist). Pt is afebrile, RA, pain is controlled.    PAST MEDICAL & SURGICAL HISTORY:  Phalanx (hand) fracture  right      No pertinent past medical history      No pertinent past medical history      NO SIGNIFICANT PAST SURGICAL HISTORY      No significant past surgical history          ROS:    [ ] Unobtainable because:  [x ] All other systems negative    Constitutional: no fever, no chills  Head: no trauma  Eyes: no vision changes, no eye pain  ENT:  no sore throat, no rhinorrhea  Cardiovascular:  no chest pain, no palpitation  Respiratory:  no SOB, no cough  GI:  no abd pain, no vomiting, no diarrhea  urinary: no dysuria, no hematuria, no flank pain  musculoskeletal:  post op pain is controlled   skin:  no rash  neurology:  no headache, no seizure, no change in mental status  psych: no anxiety, no depression         Allergies  shellfish (Anaphylaxis)  shrimp (Angioedema)  No Known Drug Allergies        ANTIMICROBIALS:  ampicillin/sulbactam  IVPB 3 every 6 hours      OTHER MEDS:  acetaminophen     Tablet .. 650 milliGRAM(s) Oral every 6 hours PRN  hydrALAZINE 25 milliGRAM(s) Oral every 8 hours  lactulose Syrup 10 Gram(s) Oral three times a day  melatonin 3 milliGRAM(s) Oral at bedtime PRN  morphine  - Injectable 2 milliGRAM(s) IV Push every 4 hours PRN  oxyCODONE    IR 5 milliGRAM(s) Oral every 6 hours PRN  senna 2 Tablet(s) Oral at bedtime      Vital Signs Last 24 Hrs  T(C): 36.8 (27 Sep 2023 11:36), Max: 37 (27 Sep 2023 06:45)  T(F): 98.2 (27 Sep 2023 11:36), Max: 98.6 (27 Sep 2023 06:45)  HR: 94 (27 Sep 2023 11:36) (58 - 94)  BP: 163/96 (27 Sep 2023 12:53) (129/88 - 188/93)  BP(mean): --  RR: 19 (27 Sep 2023 11:36) (10 - 19)  SpO2: 99% (27 Sep 2023 11:36) (98% - 100%)    Parameters below as of 27 Sep 2023 11:36  Patient On (Oxygen Delivery Method): room air        Physical Exam:  General: NAD,  non toxic  Head: atraumatic, normocephalic  Eye: normal sclera and conjunctiva  ENT:  no oral lesions, neck supple  Cardio:  regular S1, S2,  no murmur  Respiratory:    clear b/l,    no wheezing  abd:     soft,   BS +,   no tenderness  :   no CVAT,  no suprapubic tenderness,   no  breumen  Musculoskeletal:   no joint swelling,   no edema  vascular: no central lines, +PIV   Skin:  hand remains heavily wrapped post yesterday's surgery   Neurologic:     no focal deficit  psych: normal affect    WBC Count: 3.98 K/uL (09-26 @ 06:30)  WBC Count: 4.24 K/uL (09-24 @ 06:50)  WBC Count: 5.85 K/uL (09-22 @ 06:10)  WBC Count: 5.53 K/uL (09-21 @ 06:22)                            11.3   3.98  )-----------( 176      ( 26 Sep 2023 06:30 )             34.5       09-27    140  |  107  |  15  ----------------------------<  110<H>  4.3   |  27  |  1.06    Ca    9.1      27 Sep 2023 06:35    TPro  5.2<L>  /  Alb  2.3<L>  /  TBili  0.2  /  DBili  x   /  AST  18  /  ALT  22  /  AlkPhos  51  09-26      Urinalysis Basic - ( 27 Sep 2023 06:35 )    Color: x / Appearance: x / SG: x / pH: x  Gluc: 110 mg/dL / Ketone: x  / Bili: x / Urobili: x   Blood: x / Protein: x / Nitrite: x   Leuk Esterase: x / RBC: x / WBC x   Sq Epi: x / Non Sq Epi: x / Bacteria: x        Creatinine Trend: 1.06<--, 0.70<--, 0.91<--, 0.94<--, 0.86<--, 0.95<--      MICROBIOLOGY:  v  Clean Catch Clean Catch (Midstream)  09-20-23   No growth  --  --      .Tissue Other  09-20-23   Moderate Staphylococcus aureus  --  Staphylococcus aureus      .Blood Blood-Peripheral  09-20-23   No growth at 5 days  --  --      .Blood Blood-Peripheral  09-20-23   No growth at 5 days  --  --      C-Reactive Protein, Serum: 91 (09-20)      RADIOLOGY:    
[facility] Division of Hospital Medicine  Arturo Oneill MD  Available via MS Teams    SUBJECTIVE / OVERNIGHT EVENTS: Patient seen and examined at bedside, resting comfortably and in no acute distress. Denies chest pain or palpitations. Denies fever or chills. Reports pain in hand is under control with current regimen. Reports has not had a bowel movement since admission. ROS otherwise noncontributory.     MEDICATIONS  (STANDING):  ampicillin/sulbactam  IVPB 3 Gram(s) IV Intermittent every 6 hours  hydrALAZINE 25 milliGRAM(s) Oral every 8 hours  lactulose Syrup 10 Gram(s) Oral three times a day  senna 2 Tablet(s) Oral at bedtime    MEDICATIONS  (PRN):  acetaminophen     Tablet .. 650 milliGRAM(s) Oral every 6 hours PRN Mild Pain (1 - 3)  melatonin 3 milliGRAM(s) Oral at bedtime PRN Insomnia  morphine  - Injectable 2 milliGRAM(s) IV Push every 4 hours PRN Severe Pain (7 - 10)  oxyCODONE    IR 5 milliGRAM(s) Oral every 6 hours PRN Moderate Pain (4 - 6)      I&O's Summary    22 Sep 2023 07:01  -  23 Sep 2023 07:00  --------------------------------------------------------  IN: 1240 mL / OUT: 0 mL / NET: 1240 mL        PHYSICAL EXAM:  Vital Signs Last 24 Hrs  T(C): 36.7 (23 Sep 2023 11:53), Max: 37.2 (22 Sep 2023 21:30)  T(F): 98.1 (23 Sep 2023 11:53), Max: 99 (22 Sep 2023 21:30)  HR: 75 (23 Sep 2023 11:53) (67 - 86)  BP: 135/84 (23 Sep 2023 11:53) (130/85 - 144/83)  BP(mean): --  RR: 17 (23 Sep 2023 11:53) (17 - 18)  SpO2: 98% (23 Sep 2023 11:53) (98% - 100%)    Parameters below as of 23 Sep 2023 05:34  Patient On (Oxygen Delivery Method): room air      CONSTITUTIONAL: NAD, well-groomed  EYES: PERRLA; conjunctiva and sclera clear  ENMT: Moist oral mucosa, no pharyngeal injection or exudates; normal dentition  NECK: Supple, no palpable masses; no thyromegaly  RESPIRATORY: Normal respiratory effort; lungs are clear to auscultation bilaterally  CARDIOVASCULAR: normal S1 and S2, no murmur/rub/gallop; No lower extremity edema  ABDOMEN: Nontender to palpation, normoactive bowel sounds, no rebound/guarding; No hepatosplenomegaly  MUSCULOSKELETAL:  no clubbing or cyanosis of digits; no joint swelling or tenderness to palpation  PSYCH: A+O to person, place, and time; affect appropriate  NEUROLOGY: CN 2-12 are intact and symmetric; no gross sensory deficits   SKIN: No rashes; Dressing in place over right hand     LABS:                        13.4   5.85  )-----------( 132      ( 22 Sep 2023 06:10 )             39.4     09-22    137  |  101  |  7   ----------------------------<  84  3.8   |  31  |  0.94    Ca    8.9      22 Sep 2023 06:10    TPro  6.5  /  Alb  2.9<L>  /  TBili  0.5  /  DBili  x   /  AST  14<L>  /  ALT  16  /  AlkPhos  66  09-22          Urinalysis Basic - ( 22 Sep 2023 06:10 )    Color: x / Appearance: x / SG: x / pH: x  Gluc: 84 mg/dL / Ketone: x  / Bili: x / Urobili: x   Blood: x / Protein: x / Nitrite: x   Leuk Esterase: x / RBC: x / WBC x   Sq Epi: x / Non Sq Epi: x / Bacteria: x        Culture - Urine (collected 20 Sep 2023 19:45)  Source: Clean Catch Clean Catch (Midstream)  Final Report (22 Sep 2023 06:31):    No growth    Culture - Tissue with Gram Stain (collected 20 Sep 2023 15:30)  Source: .Tissue Other  Gram Stain (prelim) (21 Sep 2023 19:16):    Few polymorphonuclear leukocytes per low power field    No organisms seen per low power field  Preliminary Report (21 Sep 2023 19:12):    Moderate Staphylococcus aureus  Organism: Staphylococcus aureus (22 Sep 2023 22:28)  Organism: Staphylococcus aureus (22 Sep 2023 22:28)    Culture - Blood (collected 20 Sep 2023 14:30)  Source: .Blood Blood-Peripheral  Preliminary Report (22 Sep 2023 19:01):    No growth at 48 Hours    Culture - Blood (collected 20 Sep 2023 14:00)  Source: .Blood Blood-Peripheral  Preliminary Report (22 Sep 2023 19:01):    No growth at 48 Hours            RADIOLOGY & ADDITIONAL TESTS:  New Results Reviewed Today:   New Imaging Personally Reviewed Today:  New Electrocardiogram Personally Reviewed Today:  Prior or Outpatient Records Reviewed Today:    COMMUNICATION:  Care Discussed with Consultants/Other Providers and Details of Discussion:  Discussions with Patient/Family:  PCP Communication:    
Patient is a 37y old  Male who presents with a chief complaint of right fingers infection (21 Sep 2023 23:55)    INTERVAL HPI/OVERNIGHT EVENTS: No acute events overnight. HD stable.     MEDICATIONS  (STANDING):  ampicillin/sulbactam  IVPB 3 Gram(s) IV Intermittent every 6 hours  hydrALAZINE 25 milliGRAM(s) Oral every 8 hours  senna 2 Tablet(s) Oral at bedtime  vancomycin  IVPB 1250 milliGRAM(s) IV Intermittent every 8 hours    MEDICATIONS  (PRN):  acetaminophen     Tablet .. 650 milliGRAM(s) Oral every 6 hours PRN Mild Pain (1 - 3)  melatonin 3 milliGRAM(s) Oral at bedtime PRN Insomnia  morphine  - Injectable 2 milliGRAM(s) IV Push every 4 hours PRN Severe Pain (7 - 10)  oxyCODONE    IR 5 milliGRAM(s) Oral every 6 hours PRN Moderate Pain (4 - 6)      Allergies    shellfish (Anaphylaxis)  shrimp (Angioedema)  No Known Drug Allergies    Intolerances        REVIEW OF SYSTEMS: all negative with exception of above    Vital Signs Last 24 Hrs  T(C): 36.8 (22 Sep 2023 11:22), Max: 37.5 (21 Sep 2023 20:00)  T(F): 98.2 (22 Sep 2023 11:22), Max: 99.5 (21 Sep 2023 20:00)  HR: 86 (22 Sep 2023 13:54) (71 - 89)  BP: 142/90 (22 Sep 2023 13:54) (142/90 - 166/95)  BP(mean): --  RR: 16 (22 Sep 2023 11:22) (16 - 20)  SpO2: 99% (22 Sep 2023 11:22) (98% - 99%)    Parameters below as of 21 Sep 2023 20:00  Patient On (Oxygen Delivery Method): room air      PHYSICAL EXAM:  GENERAL: NAD, well-groomed  NERVOUS SYSTEM:  Alert & Oriented X3, Good concentration; Motor Strength 5/5 B/L upper and lower extremities; DTRs 2+ intact and symmetric  CHEST/LUNG: Clear to percussion bilaterally; No rales, rhonchi, wheezing, or rubs  HEART: Regular rate and rhythm; No murmurs, rubs, or gallops  ABDOMEN: Soft, Nontender, Nondistended; Bowel sounds present  EXTREMITIES:  2+ Peripheral Pulses, No clubbing, cyanosis, or edema. Right hand dressing in place     LABS:                        13.4   5.85  )-----------( 132      ( 22 Sep 2023 06:10 )             39.4     09-22    137  |  101  |  7   ----------------------------<  84  3.8   |  31  |  0.94    Ca    8.9      22 Sep 2023 06:10  Phos  2.6     09-21  Mg     1.6     09-21    TPro  6.5  /  Alb  2.9<L>  /  TBili  0.5  /  DBili  x   /  AST  14<L>  /  ALT  16  /  AlkPhos  66  09-22      Urinalysis Basic - ( 22 Sep 2023 06:10 )    Color: x / Appearance: x / SG: x / pH: x  Gluc: 84 mg/dL / Ketone: x  / Bili: x / Urobili: x   Blood: x / Protein: x / Nitrite: x   Leuk Esterase: x / RBC: x / WBC x   Sq Epi: x / Non Sq Epi: x / Bacteria: x      CAPILLARY BLOOD GLUCOSE          RADIOLOGY & ADDITIONAL TESTS:    Imaging Personally Reviewed:  [ ] YES  [ ] NO    Consultant(s) Notes Reviewed:  [ ] YES  [ ] NO    Care Discussed with Consultants/Other Providers [ ] YES  [ ] NO
Patient is a 37y old  Male who presents with a chief complaint of right fingers infection (20 Sep 2023 17:29)    INTERVAL HPI/OVERNIGHT EVENTS: No acute events overnight. HD stable.     MEDICATIONS  (STANDING):  ampicillin/sulbactam  IVPB 3 Gram(s) IV Intermittent every 6 hours  hydrALAZINE 25 milliGRAM(s) Oral every 8 hours  senna 2 Tablet(s) Oral at bedtime  vancomycin  IVPB 1250 milliGRAM(s) IV Intermittent every 8 hours    MEDICATIONS  (PRN):  acetaminophen     Tablet .. 650 milliGRAM(s) Oral every 6 hours PRN Mild Pain (1 - 3)  HYDROmorphone  Injectable 0.5 milliGRAM(s) IV Push every 6 hours PRN breakthrough pain  melatonin 3 milliGRAM(s) Oral at bedtime PRN Insomnia  morphine  - Injectable 2 milliGRAM(s) IV Push every 4 hours PRN Severe Pain (7 - 10)  oxyCODONE    IR 5 milliGRAM(s) Oral every 6 hours PRN Moderate Pain (4 - 6)      Allergies    shellfish (Anaphylaxis)  shrimp (Angioedema)  No Known Drug Allergies    Intolerances        REVIEW OF SYSTEMS: all negative with exception of above    Vital Signs Last 24 Hrs  T(C): 36.7 (21 Sep 2023 17:17), Max: 37.2 (20 Sep 2023 20:56)  T(F): 98.1 (21 Sep 2023 17:17), Max: 98.9 (20 Sep 2023 20:56)  HR: 72 (21 Sep 2023 17:38) (63 - 77)  BP: 146/77 (21 Sep 2023 17:38) (146/77 - 171/96)  BP(mean): --  RR: 20 (21 Sep 2023 17:17) (18 - 20)  SpO2: 99% (21 Sep 2023 17:17) (98% - 100%)    Parameters below as of 21 Sep 2023 17:17  Patient On (Oxygen Delivery Method): room air        PHYSICAL EXAM:  GENERAL: NAD, well-groomed  NERVOUS SYSTEM:  Alert & Oriented X3, Good concentration; Motor Strength 5/5 B/L upper and lower extremities; DTRs 2+ intact and symmetric  CHEST/LUNG: Clear to percussion bilaterally; No rales, rhonchi, wheezing, or rubs  HEART: Regular rate and rhythm; No murmurs, rubs, or gallops  ABDOMEN: Soft, Nontender, Nondistended; Bowel sounds present  EXTREMITIES:  2+ Peripheral Pulses, No clubbing, cyanosis, or edema. Right hand dressing in place     LABS:                        12.1   5.53  )-----------( 120      ( 21 Sep 2023 06:22 )             35.7     09-21    139  |  105  |  8   ----------------------------<  101<H>  3.6   |  30  |  0.86    Ca    8.5      21 Sep 2023 06:22  Phos  2.6     09-21  Mg     1.6     09-21    TPro  6.2  /  Alb  2.9<L>  /  TBili  0.6  /  DBili  x   /  AST  13<L>  /  ALT  14  /  AlkPhos  62  09-21    PT/INR - ( 20 Sep 2023 14:00 )   PT: 10.5 sec;   INR: 0.88 ratio         PTT - ( 20 Sep 2023 14:00 )  PTT:34.7 sec  Urinalysis Basic - ( 21 Sep 2023 06:22 )    Color: x / Appearance: x / SG: x / pH: x  Gluc: 101 mg/dL / Ketone: x  / Bili: x / Urobili: x   Blood: x / Protein: x / Nitrite: x   Leuk Esterase: x / RBC: x / WBC x   Sq Epi: x / Non Sq Epi: x / Bacteria: x      CAPILLARY BLOOD GLUCOSE          RADIOLOGY & ADDITIONAL TESTS:    Imaging Personally Reviewed:  [ ] YES  [ ] NO    Consultant(s) Notes Reviewed:  [ ] YES  [ ] NO    Care Discussed with Consultants/Other Providers [ ] YES  [ ] NO
[facility] Division of Hospital Medicine  Arturo Oneill MD  Available via MS Teams    SUBJECTIVE / OVERNIGHT EVENTS: Patient seen and examined at bedside, resting comfortably and in no acute distress. Denies chest pain or palpitations. Denies shortness of breath or cough. Reports pain in hand is under control. Reports has not yet had BM.     MEDICATIONS  (STANDING):  ampicillin/sulbactam  IVPB 3 Gram(s) IV Intermittent every 6 hours  hydrALAZINE 25 milliGRAM(s) Oral every 8 hours  lactulose Syrup 10 Gram(s) Oral three times a day  senna 2 Tablet(s) Oral at bedtime    MEDICATIONS  (PRN):  acetaminophen     Tablet .. 650 milliGRAM(s) Oral every 6 hours PRN Mild Pain (1 - 3)  melatonin 3 milliGRAM(s) Oral at bedtime PRN Insomnia  morphine  - Injectable 2 milliGRAM(s) IV Push every 4 hours PRN Severe Pain (7 - 10)  oxyCODONE    IR 5 milliGRAM(s) Oral every 6 hours PRN Moderate Pain (4 - 6)      I&O's Summary    23 Sep 2023 07:01  -  24 Sep 2023 07:00  --------------------------------------------------------  IN: 740 mL / OUT: 0 mL / NET: 740 mL        PHYSICAL EXAM:  Vital Signs Last 24 Hrs  T(C): 36.9 (24 Sep 2023 05:13), Max: 37.6 (23 Sep 2023 23:13)  T(F): 98.4 (24 Sep 2023 05:13), Max: 99.6 (23 Sep 2023 23:13)  HR: 79 (24 Sep 2023 10:00) (69 - 79)  BP: 168/98 (24 Sep 2023 10:00) (133/81 - 179/100)  BP(mean): --  RR: 18 (24 Sep 2023 05:13) (17 - 18)  SpO2: 98% (24 Sep 2023 05:13) (98% - 100%)    Parameters below as of 24 Sep 2023 05:13  Patient On (Oxygen Delivery Method): room air      CONSTITUTIONAL: NAD, well-groomed  EYES: PERRLA; conjunctiva and sclera clear  ENMT: Moist oral mucosa, no pharyngeal injection or exudates; normal dentition  NECK: Supple, no palpable masses; no thyromegaly  RESPIRATORY: Normal respiratory effort; lungs are clear to auscultation bilaterally  CARDIOVASCULAR: normal S1 and S2, no murmur/rub/gallop; No lower extremity edema  ABDOMEN: Nontender to palpation, normoactive bowel sounds, no rebound/guarding; No hepatosplenomegaly  MUSCULOSKELETAL:  no clubbing or cyanosis of digits; no joint swelling or tenderness to palpation  PSYCH: A+O to person, place, and time; affect appropriate  NEUROLOGY: CN 2-12 are intact and symmetric; no gross sensory deficits   SKIN: Surgical dressing in place over right hand     LABS:                12.1   4.24  )-----------( 161      ( 24 Sep 2023 06:50 )             36.7     138  |  104  |  10  ----------------------------<  82  3.8   |  29  |  0.91    Ca    8.8      24 Sep 2023 06:50  Phos  3.8     09-24  Mg     2.2     09-24            Urinalysis Basic - ( 24 Sep 2023 06:50 )    Color: x / Appearance: x / SG: x / pH: x  Gluc: 82 mg/dL / Ketone: x  / Bili: x / Urobili: x   Blood: x / Protein: x / Nitrite: x   Leuk Esterase: x / RBC: x / WBC x   Sq Epi: x / Non Sq Epi: x / Bacteria: x
Patient is a 37y old  Male who presents with a chief complaint of right fingers infection (27 Sep 2023 14:28)    INTERVAL HPI/OVERNIGHT EVENTS: No acute events overnight. HD stable.     MEDICATIONS  (STANDING):  ceFAZolin   IVPB 2000 milliGRAM(s) IV Intermittent every 8 hours  hydrALAZINE 25 milliGRAM(s) Oral every 8 hours  lactulose Syrup 10 Gram(s) Oral three times a day  senna 2 Tablet(s) Oral at bedtime    MEDICATIONS  (PRN):  acetaminophen     Tablet .. 650 milliGRAM(s) Oral every 6 hours PRN Mild Pain (1 - 3)  melatonin 3 milliGRAM(s) Oral at bedtime PRN Insomnia  morphine  - Injectable 2 milliGRAM(s) IV Push every 4 hours PRN Severe Pain (7 - 10)  oxyCODONE    IR 5 milliGRAM(s) Oral every 6 hours PRN Moderate Pain (4 - 6)      Allergies    shellfish (Anaphylaxis)  shrimp (Angioedema)  No Known Drug Allergies    Intolerances        REVIEW OF SYSTEMS: all negative with exception of above    Vital Signs Last 24 Hrs  T(C): 36.8 (27 Sep 2023 11:36), Max: 37 (27 Sep 2023 06:45)  T(F): 98.2 (27 Sep 2023 11:36), Max: 98.6 (27 Sep 2023 06:45)  HR: 94 (27 Sep 2023 11:36) (58 - 94)  BP: 163/96 (27 Sep 2023 12:53) (129/88 - 188/93)  BP(mean): --  RR: 19 (27 Sep 2023 11:36) (15 - 19)  SpO2: 99% (27 Sep 2023 11:36) (98% - 99%)    Parameters below as of 27 Sep 2023 11:36  Patient On (Oxygen Delivery Method): room air        PHYSICAL EXAM:  GENERAL: NAD, well-groomed  NERVOUS SYSTEM:  Alert & Oriented X3, Good concentration; Motor Strength 5/5 B/L upper and lower extremities; DTRs 2+ intact and symmetric  CHEST/LUNG: Clear to percussion bilaterally; No rales, rhonchi, wheezing, or rubs  HEART: Regular rate and rhythm; No murmurs, rubs, or gallops  ABDOMEN: Soft, Nontender, Nondistended; Bowel sounds present  EXTREMITIES:  2+ Peripheral Pulses, No clubbing, cyanosis, or edema      LABS:                        11.3   3.98  )-----------( 176      ( 26 Sep 2023 06:30 )             34.5     09-27    140  |  107  |  15  ----------------------------<  110<H>  4.3   |  27  |  1.06    Ca    9.1      27 Sep 2023 06:35    TPro  5.2<L>  /  Alb  2.3<L>  /  TBili  0.2  /  DBili  x   /  AST  18  /  ALT  22  /  AlkPhos  51  09-26      Urinalysis Basic - ( 27 Sep 2023 06:35 )    Color: x / Appearance: x / SG: x / pH: x  Gluc: 110 mg/dL / Ketone: x  / Bili: x / Urobili: x   Blood: x / Protein: x / Nitrite: x   Leuk Esterase: x / RBC: x / WBC x   Sq Epi: x / Non Sq Epi: x / Bacteria: x      CAPILLARY BLOOD GLUCOSE          RADIOLOGY & ADDITIONAL TESTS:    Imaging Personally Reviewed:  [ ] YES  [ ] NO    Consultant(s) Notes Reviewed:  [ ] YES  [ ] NO    Care Discussed with Consultants/Other Providers [ ] YES  [ ] NO

## 2023-09-28 NOTE — DISCHARGE NOTE PROVIDER - PROVIDER TOKENS
FREE:[LAST:[PCP],FIRST:[PCP],PHONE:[(   )    -],FAX:[(   )    -],ADDRESS:[Follow up with your primary care doctor within 1 week]] FREE:[LAST:[PCP],FIRST:[PCP],PHONE:[(   )    -],FAX:[(   )    -],ADDRESS:[Follow up with your primary care doctor within 1 week]],PROVIDER:[TOKEN:[[id number]]]

## 2023-09-28 NOTE — DISCHARGE NOTE PROVIDER - CARE PROVIDER_API CALL
PCP, PCP  Follow up with your primary care doctor within 1 week  Phone: (   )    -  Fax: (   )    -  Follow Up Time:    PCP, PCP  Follow up with your primary care doctor within 1 week  Phone: (   )    -  Fax: (   )    -  Follow Up Time:     Wilbert Anderson  Plastic Surgery  [address] [place] [address] [id number]  Phone: [phone]  Fax: [phone]  Follow Up Time:

## 2023-09-28 NOTE — PROGRESS NOTE ADULT - ASSESSMENT
37M with finger infection/abscess   afebrile  leukocytosis   CXR clear   s/p debridement by plastic surgery though likely needs more   blood cultures were sent   abscess cultures sent   tdap has been given     9/21:continue antibiotics, pain control, surgery next week, follow cultures   9/22: no fevers, RA, no wbc, Cr ok, BCs with no growth to date, tissue culture is growing Moderate Staphylococcus aureus, awaiting for sensitivity. Will continue with IV Vancomycin and IV Unasyn for now, Vanco level is 14.8 today. Possibly back to OR next week.   Attending addendum:  I have reviewed all pertinent clinical information and agree with the NP's note.   continue antibiotics   send vancomycin trough with target AUC/HAIR 400-600   (therapeutic drug monitoring required with IV vancomycin)   more debridement next week     9/25: no fevers, RA, no WBC, Cr ok, no new lab work, BCs with no growth to date, wound culture is growing staphylococcus aureus, Vancomycin IV was stopped over the weekend, Unasyn Iv continued, possible further debridement tomorrow.   Attending Addendum--  Case reviewed with NP Lora Carney. Her note reviewed and modified as appropriate.   Cx with MSSA  WBC normal, at times low- monitor.  Current antibiotics adequate  Await debridement    9/26: awaiting for OR - possibly later today, no fevers, RA, no wbc, Cr and LFTs ok, BCs remain with no growth to date, tissue cultures with staph aureus, Unasyn IV continued.   Attending addendum:  I have reviewed all pertinent clinical information and agree with the NP's note.   s/p surgery today with plastic surgery  extensive infection involving the joints -will treat for septic arthritis and possible osteomyelitis   needs 6 weeks of antibiotics, IV vs PO pending discussion with patient tomorrow     9/27: s/p closed reduction and perc. pinning of injury of finger and wrist performed yesterday 9/26, afebrile, RA, no new cbc, Cr ok, culture data reviewed, new sx culture is pending, will change to Cefazolin from Unasyn. pt will need six weeks of abx treatment, patient has opted for IV   Attending addendum:  I have reviewed all pertinent clinical information and agree with the NP's note.   after discussion with patient regarding risks and benefits of IV vs PO, short duration vs long, picc line risks->patient is in agreement to have a picc line placed and administer antibiotics at home for 6 weeks   We will plan for 6 weeks of IV cefazolin 2g q8hrs   Weekly cbc, cmp, esr and crp  follow up with me within 4 weeks of discharge   [address] [place] [address], Entrance [address], [place] Office: [phone] Fax: [phone]     9/28: doing well, pending picc line placement, no fevers, RA, no new lab work, surgical culture with no growth, will need six weeks of IV abx.     Plan:  continue Cefazolin 2g q8hrs  follow abscess cultures - staph aureus  blood cultures NGTD  surgical culture - NGTD  hand elevation   pain control      when ready for discharge:  place a picc line  continue Cefazolin 2 g IV q 8 hrs - last dose 11/6/2023  weekly lab work: cbc with diff, cmp, esr, crp - fax to Dr. Lopez at [phone]  follow up with Dr. Lopez in the office:  [address] [place] [address]    [place] [address] [phone]  remove picc line upon completion of the course of abx    Discussed with Dr. Lopez  Discussed with Dr. Mooney  Discussed with IR  Discussed with the

## 2023-09-28 NOTE — PROCEDURE NOTE - NSTIMEOUT_GEN_A_CORE
59
Patient's first and last name, , procedure, and correct site confirmed prior to the start of procedure.

## 2023-09-28 NOTE — DISCHARGE NOTE PROVIDER - NSDCMRMEDTOKEN_GEN_ALL_CORE_FT
azithromycin 500 mg oral tablet: 1 tab(s) orally once a day  ceFAZolin 2 g injection: 2 gram(s) intravenously 3 times a day Saline Flushes  Weekly CBC, CMP, ESR and CRP faxed to [phone]  Stop November 6th, 2023   mg oral tablet: 1 tab(s) orally every 8 hours PRN for pain  ibuprofen 600 mg oral tablet: 1 tab(s) orally every 6 hours as needed for  moderate pain  ondansetron 8 mg oral tablet: 1 tab(s) orally 2 times a day x 3 days, As Needed - for nausea  Percocet 5/325 325 mg-5 mg oral tablet: 1-2 tab(s) orally every 6 hours, As Needed for pain MDD:8 tabs  PriLOSEC 10 mg oral powder for reconstitution, delayed release: 1 each orally once a day   ZyrTEC-D 5 mg-120 mg oral tablet, extended release: 1 tab(s) orally once a day   lactulose 10 g/15 mL oral syrup: 15 milliliter(s) orally 3 times a day  senna leaf extract oral tablet: 2 tab(s) orally once a day (at bedtime)

## 2023-09-28 NOTE — PROGRESS NOTE ADULT - PROVIDER SPECIALTY LIST ADULT
Hospitalist
Infectious Disease
Infectious Disease
Internal Medicine
Infectious Disease
Infectious Disease
Surgery
Infectious Disease
Infectious Disease
Internal Medicine
Hospitalist

## 2023-09-28 NOTE — PROGRESS NOTE ADULT - NS ATTEND AMEND GEN_ALL_CORE FT
Attending Addendum--  Case reviewed with WOODY Carney. Her note reviewed and modified as appropriate.   Cx with MSSA  WBC normal, at times low- monitor.  Current antibiotics adequate  Await debridement  Cruz David MD  Attending Physician  [facility]  Division of Infectious Diseases  [phone]
I have reviewed all pertinent clinical information and agree with the NP's note.   place picc line and plan for 6 weeks of IV cefazolin  labs to be sent to me/my office   weekly cbc, cmp, esr and crp   Follow up in infectious disease office after discharge     Hany Lopez, DO  Infectious Disease Attending  Reachable via [employer] Teams or ID office: [phone]  After 5pm/weekends please call [phone] for all inquiries and new consults
I have reviewed all pertinent clinical information and agree with the NP's note.   s/p surgery today with plastic surgery  extensive infection involving the joints -will treat for septic arthritis and possible osteomyelitis   needs 6 weeks of antibiotics, IV vs PO pending discussion with patient tomorrow     Discussed with Dr. Justin Lopez, DO  Infectious Disease Attending  Reachable via [employer] Teams or ID office: [phone]  After 5pm/weekends please call [phone] for all inquiries and new consults
I have reviewed all pertinent clinical information and agree with the NP's note.   after discussion with patient regarding risks and benefits of IV vs PO, short duration vs long, picc line risks->patient is in agreement to have a picc line placed and administer antibiotics at home for 6 weeks   We will plan for 6 weeks of IV cefazolin 2g q8hrs   Weekly cbc, cmp, esr and crp  follow up with me within 4 weeks of discharge   [address] [place] [address], [address] Office: [phone] Fax: [phone]     Discussed with Dr. Vinicio Lopez, DO  Infectious Disease Attending  Reachable via [employer] Teams or ID office: [phone]  After 5pm/weekends please call [phone] for all inquiries and new consults
I have reviewed all pertinent clinical information and agree with the NP's note.   continue antibiotics   send vancomycin trough with target AUC/HAIR 400-600   (therapeutic drug monitoring required with IV vancomycin)   more debridement next week       Hany Lopez, DO  Infectious Disease Attending  Reachable via [employer] Teams or ID office: [phone]  After 5pm/weekends please call [phone] for all inquiries and new consults

## 2023-09-28 NOTE — DISCHARGE NOTE PROVIDER - NSDCCPCAREPLAN_GEN_ALL_CORE_FT
PRINCIPAL DISCHARGE DIAGNOSIS  Diagnosis: Cellulitis of right hand  Assessment and Plan of Treatment: s/p surgical procedure during this admission for treatment of this condition. Patient treated with IV antibiotics for abscess of his finger.   Condition has improved. Continue to monitor area for any signs of recurrence   Follow up with PCP      SECONDARY DISCHARGE DIAGNOSES  Diagnosis: Fracture of finger of right hand  Assessment and Plan of Treatment:      PRINCIPAL DISCHARGE DIAGNOSIS  Diagnosis: Cellulitis of right hand  Assessment and Plan of Treatment: s/p surgical procedure during this admission for treatment of this condition. Patient treated with IV antibiotics for abscess of his finger.   Condition has improved. Continue to monitor area for any signs of recurrence   Follow up with PCP      SECONDARY DISCHARGE DIAGNOSES  Diagnosis: Fracture of finger of right hand  Assessment and Plan of Treatment:     Diagnosis: Abscess of skin  Assessment and Plan of Treatment:     Diagnosis: Therapeutic opioid induced constipation  Assessment and Plan of Treatment:

## 2023-09-28 NOTE — DISCHARGE NOTE NURSING/CASE MANAGEMENT/SOCIAL WORK - PATIENT PORTAL LINK FT
You can access the FollowMyHealth Patient Portal offered by [facility] by registering at the following website: [email or web address]. By joining [facility] FollowMyHealth portal, you will also be able to view your health information using other applications (apps) compatible with our system.

## 2023-09-28 NOTE — PROGRESS NOTE ADULT - REASON FOR ADMISSION
right fingers infection

## 2023-09-28 NOTE — DISCHARGE NOTE NURSING/CASE MANAGEMENT/SOCIAL WORK - NSDCPEFALRISK_GEN_ALL_CORE
For information on Fall & Injury Prevention, visit: [email or web address] OR  [email or web address] OR  https://www.cdc.gov/steadi/patient.html

## 2023-09-28 NOTE — DISCHARGE NOTE NURSING/CASE MANAGEMENT/SOCIAL WORK - NSDCVIVACCINE_GEN_ALL_CORE_FT
Tdap; 17-Jan-2017 11:33; Donis Quintana (RN); [employer]; [id number]; IntraMuscular; Deltoid Left.; 0.5 milliLiter(s); VIS (VIS Published: 09-May-2013, VIS Presented: 17-Jan-2017);   Tdap; 14-Sep-2023 21:13; Lianna Laurent (ANDREWS); [employer]; [id number] (Exp. Date: 06-Jun-2025); IntraMuscular; Deltoid Left.; 0.5 milliLiter(s); VIS (VIS Published: 09-May-2013, VIS Presented: 14-Sep-2023);

## 2023-09-29 LAB
-  AMPICILLIN/SULBACTAM: SIGNIFICANT CHANGE UP
-  CEFAZOLIN: SIGNIFICANT CHANGE UP
-  CLINDAMYCIN: SIGNIFICANT CHANGE UP
-  ERYTHROMYCIN: SIGNIFICANT CHANGE UP
-  GENTAMICIN: SIGNIFICANT CHANGE UP
-  OXACILLIN: SIGNIFICANT CHANGE UP
-  PENICILLIN: SIGNIFICANT CHANGE UP
-  RIFAMPIN: SIGNIFICANT CHANGE UP
-  TETRACYCLINE: SIGNIFICANT CHANGE UP
-  TRIMETHOPRIM/SULFAMETHOXAZOLE: SIGNIFICANT CHANGE UP
-  VANCOMYCIN: SIGNIFICANT CHANGE UP
METHOD TYPE: SIGNIFICANT CHANGE UP

## 2023-10-01 LAB
CULTURE RESULTS: SIGNIFICANT CHANGE UP
ORGANISM # SPEC MICROSCOPIC CNT: SIGNIFICANT CHANGE UP
ORGANISM # SPEC MICROSCOPIC CNT: SIGNIFICANT CHANGE UP
SPECIMEN SOURCE: SIGNIFICANT CHANGE UP

## 2024-12-12 ENCOUNTER — TRANSCRIPTION ENCOUNTER (OUTPATIENT)
Age: 39
End: 2024-12-12

## 2024-12-12 ENCOUNTER — EMERGENCY (EMERGENCY)
Facility: HOSPITAL | Age: 39
LOS: 0 days | Discharge: LEFT BEFORE DISCHARGE | End: 2024-12-12
Payer: MEDICAID

## 2024-12-12 VITALS
WEIGHT: 179.9 LBS | DIASTOLIC BLOOD PRESSURE: 90 MMHG | TEMPERATURE: 97 F | HEART RATE: 65 BPM | RESPIRATION RATE: 18 BRPM | SYSTOLIC BLOOD PRESSURE: 139 MMHG | OXYGEN SATURATION: 99 % | HEIGHT: 74 IN

## 2024-12-12 DIAGNOSIS — R07.9 CHEST PAIN, UNSPECIFIED: ICD-10-CM

## 2024-12-12 DIAGNOSIS — Z53.21 PROCEDURE AND TREATMENT NOT CARRIED OUT DUE TO PATIENT LEAVING PRIOR TO BEING SEEN BY HEALTH CARE PROVIDER: ICD-10-CM

## 2024-12-12 PROCEDURE — 93010 ELECTROCARDIOGRAM REPORT: CPT

## 2024-12-12 PROCEDURE — L9991: CPT

## 2024-12-12 NOTE — ED ADULT TRIAGE NOTE - CHIEF COMPLAINT QUOTE
pt c/o chest pain for the last 20 minutes. denies sob, radiation of the pain,  dizziness, nausea or vomiting. recent history of neck surgery 2 week ago. noted with c -collar in place.  history  of htn

## (undated) DEVICE — FRA-TOURNIQUET 40201020013: Type: DURABLE MEDICAL EQUIPMENT